# Patient Record
Sex: FEMALE | Race: WHITE | NOT HISPANIC OR LATINO | Employment: STUDENT | URBAN - METROPOLITAN AREA
[De-identification: names, ages, dates, MRNs, and addresses within clinical notes are randomized per-mention and may not be internally consistent; named-entity substitution may affect disease eponyms.]

---

## 2018-11-01 ENCOUNTER — APPOINTMENT (OUTPATIENT)
Dept: CT IMAGING | Facility: HOSPITAL | Age: 23
End: 2018-11-01

## 2018-11-01 ENCOUNTER — HOSPITAL ENCOUNTER (INPATIENT)
Facility: HOSPITAL | Age: 23
LOS: 5 days | Discharge: HOME | End: 2018-11-06
Attending: EMERGENCY MEDICINE | Admitting: HOSPITALIST

## 2018-11-01 DIAGNOSIS — K50.914 EXACERBATION OF CROHN'S DISEASE WITH ABSCESS (HCC): Primary | ICD-10-CM

## 2018-11-01 PROBLEM — J45.20 MILD INTERMITTENT ASTHMA WITHOUT COMPLICATION: Status: ACTIVE | Noted: 2018-11-01

## 2018-11-01 LAB
ALBUMIN SERPL-MCNC: 3.6 G/DL (ref 3.5–5.2)
ALBUMIN/GLOB SERPL: 0.9 G/DL
ALP SERPL-CCNC: 76 U/L (ref 39–117)
ALT SERPL W P-5'-P-CCNC: 23 U/L (ref 1–33)
ANION GAP SERPL CALCULATED.3IONS-SCNC: 13.5 MMOL/L
AST SERPL-CCNC: 24 U/L (ref 1–32)
B-HCG UR QL: NEGATIVE
BACTERIA UR QL AUTO: ABNORMAL /HPF
BASOPHILS # BLD AUTO: 0.02 10*3/MM3 (ref 0–0.2)
BASOPHILS NFR BLD AUTO: 0.2 % (ref 0–1.5)
BILIRUB SERPL-MCNC: 0.6 MG/DL (ref 0.1–1.2)
BILIRUB UR QL STRIP: NEGATIVE
BUN BLD-MCNC: 7 MG/DL (ref 6–20)
BUN/CREAT SERPL: 10.1 (ref 7–25)
CALCIUM SPEC-SCNC: 9.4 MG/DL (ref 8.6–10.5)
CHLORIDE SERPL-SCNC: 104 MMOL/L (ref 98–107)
CLARITY UR: CLEAR
CO2 SERPL-SCNC: 17.5 MMOL/L (ref 22–29)
COLOR UR: YELLOW
CREAT BLD-MCNC: 0.69 MG/DL (ref 0.57–1)
DEPRECATED RDW RBC AUTO: 42.5 FL (ref 37–54)
EOSINOPHIL # BLD AUTO: 0.06 10*3/MM3 (ref 0–0.7)
EOSINOPHIL NFR BLD AUTO: 0.5 % (ref 0.3–6.2)
ERYTHROCYTE [DISTWIDTH] IN BLOOD BY AUTOMATED COUNT: 13.5 % (ref 11.7–13)
GFR SERPL CREATININE-BSD FRML MDRD: 107 ML/MIN/1.73
GLOBULIN UR ELPH-MCNC: 3.9 GM/DL
GLUCOSE BLD-MCNC: 76 MG/DL (ref 65–99)
GLUCOSE UR STRIP-MCNC: NEGATIVE MG/DL
HCT VFR BLD AUTO: 44 % (ref 35.6–45.5)
HGB BLD-MCNC: 13.7 G/DL (ref 11.9–15.5)
HGB UR QL STRIP.AUTO: NEGATIVE
HOLD SPECIMEN: NORMAL
HOLD SPECIMEN: NORMAL
HYALINE CASTS UR QL AUTO: ABNORMAL /LPF
IMM GRANULOCYTES # BLD: 0.02 10*3/MM3 (ref 0–0.03)
IMM GRANULOCYTES NFR BLD: 0.2 % (ref 0–0.5)
KETONES UR QL STRIP: NEGATIVE
LEUKOCYTE ESTERASE UR QL STRIP.AUTO: ABNORMAL
LYMPHOCYTES # BLD AUTO: 1.42 10*3/MM3 (ref 0.9–4.8)
LYMPHOCYTES NFR BLD AUTO: 12.9 % (ref 19.6–45.3)
MCH RBC QN AUTO: 27.2 PG (ref 26.9–32)
MCHC RBC AUTO-ENTMCNC: 31.1 G/DL (ref 32.4–36.3)
MCV RBC AUTO: 87.3 FL (ref 80.5–98.2)
MONOCYTES # BLD AUTO: 0.61 10*3/MM3 (ref 0.2–1.2)
MONOCYTES NFR BLD AUTO: 5.5 % (ref 5–12)
NEUTROPHILS # BLD AUTO: 8.91 10*3/MM3 (ref 1.9–8.1)
NEUTROPHILS NFR BLD AUTO: 80.7 % (ref 42.7–76)
NITRITE UR QL STRIP: NEGATIVE
NRBC BLD MANUAL-RTO: 0 /100 WBC (ref 0–0)
PH UR STRIP.AUTO: 5.5 [PH] (ref 5–8)
PLATELET # BLD AUTO: 309 10*3/MM3 (ref 140–500)
PMV BLD AUTO: 9.9 FL (ref 6–12)
POTASSIUM BLD-SCNC: 4.4 MMOL/L (ref 3.5–5.2)
PROT SERPL-MCNC: 7.5 G/DL (ref 6–8.5)
PROT UR QL STRIP: ABNORMAL
RBC # BLD AUTO: 5.04 10*6/MM3 (ref 3.9–5.2)
RBC # UR: ABNORMAL /HPF
REF LAB TEST METHOD: ABNORMAL
SODIUM BLD-SCNC: 135 MMOL/L (ref 136–145)
SP GR UR STRIP: 1.02 (ref 1–1.03)
SQUAMOUS #/AREA URNS HPF: ABNORMAL /HPF
UROBILINOGEN UR QL STRIP: ABNORMAL
WBC NRBC COR # BLD: 11.04 10*3/MM3 (ref 4.5–10.7)
WBC UR QL AUTO: ABNORMAL /HPF
WHOLE BLOOD HOLD SPECIMEN: NORMAL

## 2018-11-01 PROCEDURE — 81025 URINE PREGNANCY TEST: CPT | Performed by: NURSE PRACTITIONER

## 2018-11-01 PROCEDURE — 80053 COMPREHEN METABOLIC PANEL: CPT | Performed by: NURSE PRACTITIONER

## 2018-11-01 PROCEDURE — 81001 URINALYSIS AUTO W/SCOPE: CPT | Performed by: NURSE PRACTITIONER

## 2018-11-01 PROCEDURE — 25010000002 PIPERACILLIN SOD-TAZOBACTAM PER 1 G: Performed by: EMERGENCY MEDICINE

## 2018-11-01 PROCEDURE — 99284 EMERGENCY DEPT VISIT MOD MDM: CPT

## 2018-11-01 PROCEDURE — 25010000002 PIPERACILLIN SOD-TAZOBACTAM PER 1 G: Performed by: INTERNAL MEDICINE

## 2018-11-01 PROCEDURE — 74177 CT ABD & PELVIS W/CONTRAST: CPT

## 2018-11-01 PROCEDURE — 25010000002 IOPAMIDOL 61 % SOLUTION: Performed by: EMERGENCY MEDICINE

## 2018-11-01 PROCEDURE — 25010000002 HYDROMORPHONE PER 4 MG: Performed by: INTERNAL MEDICINE

## 2018-11-01 PROCEDURE — 25010000002 ONDANSETRON PER 1 MG: Performed by: INTERNAL MEDICINE

## 2018-11-01 PROCEDURE — 85025 COMPLETE CBC W/AUTO DIFF WBC: CPT | Performed by: NURSE PRACTITIONER

## 2018-11-01 RX ORDER — SODIUM CHLORIDE 0.9 % (FLUSH) 0.9 %
3 SYRINGE (ML) INJECTION EVERY 12 HOURS SCHEDULED
Status: DISCONTINUED | OUTPATIENT
Start: 2018-11-01 | End: 2018-11-06 | Stop reason: HOSPADM

## 2018-11-01 RX ORDER — SODIUM CHLORIDE 0.9 % (FLUSH) 0.9 %
3-10 SYRINGE (ML) INJECTION AS NEEDED
Status: DISCONTINUED | OUTPATIENT
Start: 2018-11-01 | End: 2018-11-06 | Stop reason: HOSPADM

## 2018-11-01 RX ORDER — PROPRANOLOL HYDROCHLORIDE 10 MG/1
10 TABLET ORAL DAILY
COMMUNITY
End: 2019-05-14

## 2018-11-01 RX ORDER — HYDROCODONE BITARTRATE AND ACETAMINOPHEN 5; 325 MG/1; MG/1
1 TABLET ORAL EVERY 4 HOURS PRN
Status: DISCONTINUED | OUTPATIENT
Start: 2018-11-01 | End: 2018-11-02

## 2018-11-01 RX ORDER — NALOXONE HCL 0.4 MG/ML
0.4 VIAL (ML) INJECTION
Status: DISCONTINUED | OUTPATIENT
Start: 2018-11-01 | End: 2018-11-06 | Stop reason: HOSPADM

## 2018-11-01 RX ORDER — HYDROMORPHONE HYDROCHLORIDE 1 MG/ML
0.5 INJECTION, SOLUTION INTRAMUSCULAR; INTRAVENOUS; SUBCUTANEOUS
Status: DISCONTINUED | OUTPATIENT
Start: 2018-11-01 | End: 2018-11-02

## 2018-11-01 RX ORDER — ALBUTEROL SULFATE 2.5 MG/3ML
2.5 SOLUTION RESPIRATORY (INHALATION) EVERY 6 HOURS PRN
Status: DISCONTINUED | OUTPATIENT
Start: 2018-11-01 | End: 2018-11-06 | Stop reason: HOSPADM

## 2018-11-01 RX ORDER — ONDANSETRON 4 MG/1
4 TABLET, FILM COATED ORAL EVERY 6 HOURS PRN
Status: DISCONTINUED | OUTPATIENT
Start: 2018-11-01 | End: 2018-11-06 | Stop reason: HOSPADM

## 2018-11-01 RX ORDER — ACETAMINOPHEN 325 MG/1
650 TABLET ORAL EVERY 4 HOURS PRN
Status: DISCONTINUED | OUTPATIENT
Start: 2018-11-01 | End: 2018-11-06 | Stop reason: HOSPADM

## 2018-11-01 RX ORDER — ONDANSETRON 4 MG/1
4 TABLET, ORALLY DISINTEGRATING ORAL EVERY 6 HOURS PRN
Status: DISCONTINUED | OUTPATIENT
Start: 2018-11-01 | End: 2018-11-06 | Stop reason: HOSPADM

## 2018-11-01 RX ORDER — ONDANSETRON 2 MG/ML
4 INJECTION INTRAMUSCULAR; INTRAVENOUS EVERY 6 HOURS PRN
Status: DISCONTINUED | OUTPATIENT
Start: 2018-11-01 | End: 2018-11-06 | Stop reason: HOSPADM

## 2018-11-01 RX ORDER — SODIUM CHLORIDE 9 MG/ML
75 INJECTION, SOLUTION INTRAVENOUS CONTINUOUS
Status: DISCONTINUED | OUTPATIENT
Start: 2018-11-01 | End: 2018-11-04

## 2018-11-01 RX ADMIN — Medication 3 ML: at 21:45

## 2018-11-01 RX ADMIN — SODIUM CHLORIDE 100 ML/HR: 9 INJECTION, SOLUTION INTRAVENOUS at 18:33

## 2018-11-01 RX ADMIN — TAZOBACTAM SODIUM AND PIPERACILLIN SODIUM 3.38 G: 375; 3 INJECTION, SOLUTION INTRAVENOUS at 15:27

## 2018-11-01 RX ADMIN — HYDROMORPHONE HYDROCHLORIDE 0.5 MG: 1 INJECTION, SOLUTION INTRAMUSCULAR; INTRAVENOUS; SUBCUTANEOUS at 20:29

## 2018-11-01 RX ADMIN — TAZOBACTAM SODIUM AND PIPERACILLIN SODIUM 3.38 G: 375; 3 INJECTION, SOLUTION INTRAVENOUS at 20:28

## 2018-11-01 RX ADMIN — ONDANSETRON 4 MG: 2 INJECTION INTRAMUSCULAR; INTRAVENOUS at 21:52

## 2018-11-01 RX ADMIN — HYDROMORPHONE HYDROCHLORIDE 0.5 MG: 1 INJECTION, SOLUTION INTRAMUSCULAR; INTRAVENOUS; SUBCUTANEOUS at 22:42

## 2018-11-01 RX ADMIN — IOPAMIDOL 85 ML: 612 INJECTION, SOLUTION INTRAVENOUS at 13:49

## 2018-11-01 NOTE — PROGRESS NOTES
"Pharmacy Consult - Zosyn Dosing (Initial Note)    Adrienne Rodrigues has been consulted for pharmacy to dose Zosyn for exacerbation of Crohn's disease with abscess per Dr. Benites's request.    Duration of Therapy: 7 days    Other Antimicrobials: None    Relevant clinical data and objective history reviewed:  23 y.o. female 172.7 cm (68\") 72.1 kg (159 lb)    Vitals:    11/01/18 1132 11/01/18 1132 11/01/18 1403 11/01/18 1525   BP:  118/82 112/58 122/84   BP Location:       Pulse:  79 72 82   Resp:  18 16 16   Temp:       TempSrc:       SpO2:  99% 96% 95%     Patient presented to the hospital with worsening lower abdominal pain associated with intermittent constipation and diarrhea. CT scan on 11/1 showed 2 cm thick-walled mesenteric fluid collection adjacent to the terminal ileum. GI has been consulted on the case.     Creatinine   Date Value Ref Range Status   11/01/2018 0.69 0.57 - 1.00 mg/dL Final     BUN   Date Value Ref Range Status   11/01/2018 7 6 - 20 mg/dL Final     Estimated Creatinine Clearance: 146.8 mL/min (by C-G formula based on SCr of 0.69 mg/dL).    Lab Results   Component Value Date    WBC 11.04 (H) 11/01/2018     Temp Readings from Last 3 Encounters:   11/01/18 98.5 °F (36.9 °C) (Tympanic)      Baseline culture/source/susceptibility:   11/1: Blood cultures x 1-in process    Assessment/Plan    1. Zosyn 3.375g IV once was given over 30 minutes in the ED around 1530. Will start Zosyn 3.375g IV q8h extended infusion at 2130 based on risk stratification and renal function (CrCl >20 mL/min).    2. Will monitor serum creatinine every 24 hours for the first 3 days then at least every 48 hours per dosing recommendations. SCr was 0.69 this afternoon before antibiotics were started.     3. Pharmacy will continue to follow daily while on Zosyn, and adjust if needed.     Thank you for allowing me to participate in your patient's care,  Kimberlee Carlson, Pharm.D., BCPS  "

## 2018-11-01 NOTE — PROGRESS NOTES
Clinical Pharmacy Services: Medication History    Adrienne Rodrigues is a 23 y.o. female presenting to [facility] for   Chief Complaint   Patient presents with   • Abdominal Pain     Hx crohns disease, pain started x1 month ago, pt doesn't see her new MD until next week.    • Diarrhea       She  has a past medical history of Appendicitis; Asthma; and Crohn's disease (CMS/[facility]).    Allergies as of 11/01/2018   • (No Known Allergies)       Medication information was obtained from: Patient  Pharmacy and Phone Number: [facility] [phone]    Prior to Admission Medications     Prescriptions Last Dose Informant Patient Reported? Taking?    lisdexamfetamine (VYVANSE) 40 MG capsule 10/31/2018 Self Yes Yes    Take 40 mg by mouth Every Morning    LO LOESTRIN FE 1 MG-10 MCG / 10 MCG tablet 11/1/2018 Self Yes Yes    Take 1 tablet by mouth daily.    propranolol (INDERAL) 10 MG tablet 10/31/2018 Self Yes Yes    Take 10 mg by mouth Daily.            Medication notes: Propranolol added per patient, recently started.    This medication list is complete to the best of my knowledge as of 11/1/2018    Please call if questions.    Radha Coleman, Medication History Technician  11/1/2018 4:58 PM

## 2018-11-01 NOTE — PLAN OF CARE
Problem: Patient Care Overview  Goal: Plan of Care Review  Outcome: Ongoing (interventions implemented as appropriate)   11/01/18 1940   Coping/Psychosocial   Plan of Care Reviewed With patient;family;mother   Plan of Care Review   Progress no change   OTHER   Outcome Summary Pt arrived from ED with abd pain. Started on IV fluids. VSS.will continue to monitor.      Goal: Individualization and Mutuality  Outcome: Ongoing (interventions implemented as appropriate)    Goal: Discharge Needs Assessment  Outcome: Ongoing (interventions implemented as appropriate)    Goal: Interprofessional Rounds/Family Conf  Outcome: Ongoing (interventions implemented as appropriate)      Problem: Pain, Acute (Adult)  Goal: Identify Related Risk Factors and Signs and Symptoms  Outcome: Outcome(s) achieved Date Met: 11/01/18    Goal: Acceptable Pain Control/Comfort Level  Outcome: Ongoing (interventions implemented as appropriate)

## 2018-11-01 NOTE — H&P
Name: Adrienne Rodrigues ADMIT: 2018   : 1995  PCP: Breezy Valentine MD    MRN: [id number] LOS: 0 days   AGE/SEX: 23 y.o. female  ROOM: 42/42     Chief Complaint   Patient presents with   • Abdominal Pain     Hx crohns disease, pain started x1 month ago, pt doesn't see her new MD until next week.    • Diarrhea       Subjective   Ms. Rodrigues is a 23 y.o. female with a history of crohn's disease who presents to [facility] with about 1 week of worsening lower abdominal pain associated with intermittent constipation and diarrhea.  She reports no fevers but she has had some chills this morning.  The pain has been progressively worsening is the worst in her right lower quadrant and is a sharp stabbing pain.  It is exacerbated with movement and bearing down.  Relief based on position.  She has a long history of Crohn's disease since childhood and has not had any recent exacerbations.  She was scheduled to transition her care to a gastroenterologist but she has not establish care.  She has no nausea and vomiting.    On CT scan in the emergency room she was found to have 2 cm abscess.  So she is being admitted for antibiotic care GI and surgical consultation.    History of Present Illness    Past Medical History:   Diagnosis Date   • Appendicitis     H/O SUBACUTE PERFORATED APPENDICITIS   • Asthma     RESCUE INHALER, HASN'T USED   • Crohn's disease (CMS/HCC)      Past Surgical History:   Procedure Laterality Date   • APPENDECTOMY N/A 2016    Procedure: APPENDECTOMY LAPAROSCOPIC;  Surgeon: Donnie Carlson MD;  Location: [facility];  Service:    • TONSILLECTOMY       Family History   Problem Relation Age of Onset   • Cancer Maternal Grandfather    • COPD Maternal Grandfather    • Miscarriages / Stillbirths Mother    • Crohn's disease Mother      Social History   Substance Use Topics   • Smoking status: Never Smoker   • Smokeless tobacco: Not on file   • Alcohol use No       (Not in a  hospital admission)  Allergies:  No Known Allergies    Review of Systems   Constitutional: Positive for chills. Negative for fever.   HENT: Negative for sore throat and trouble swallowing.    Eyes: Negative for pain and visual disturbance.   Respiratory: Negative for cough, shortness of breath and wheezing.    Cardiovascular: Negative for chest pain, palpitations and leg swelling.   Gastrointestinal: Positive for constipation and diarrhea. Negative for nausea and vomiting.   Endocrine: Negative for cold intolerance and heat intolerance.   Genitourinary: Negative for difficulty urinating.   Musculoskeletal: Negative for neck pain and neck stiffness.   Skin: Negative for pallor and rash.   Allergic/Immunologic: Negative for environmental allergies and food allergies.   Neurological: Negative for seizures and syncope.   Hematological: Negative for adenopathy.   Psychiatric/Behavioral: Negative for agitation and confusion.        Objective    Vital Signs  Temp:  [98.5 °F (36.9 °C)] 98.5 °F (36.9 °C)  Heart Rate:  [] 82  Resp:  [16-18] 16  BP: (101-122)/(58-84) 122/84  SpO2:  [92 %-99 %] 95 %  on   ;   Device (Oxygen Therapy): room air  Body mass index is 24.18 kg/m².    Physical Exam   Constitutional: She is oriented to person, place, and time. She appears well-developed. No distress.   HENT:   Head: Atraumatic.   Nose: Nose normal.   Eyes: Pupils are equal, round, and reactive to light. Conjunctivae and EOM are normal.   Neck: Normal range of motion. Neck supple.   Cardiovascular: Normal rate, regular rhythm and intact distal pulses.    Pulmonary/Chest: Effort normal. She has no wheezes.   Abdominal: Soft. She exhibits no distension. There is tenderness (RLQ). There is guarding.   Musculoskeletal: Normal range of motion. She exhibits no edema.   Neurological: She is alert and oriented to person, place, and time. No cranial nerve deficit.   Skin: Skin is warm and dry. She is not diaphoretic.   Psychiatric: She has  a normal mood and affect. Her behavior is normal.   Nursing note and vitals reviewed.      Results Review:  I reviewed the patient's new clinical results.  I reviewed imaging, agree with interpretation.  I reviewed EKG/telemetry, sinus rhythm.  I reviewed prior records.    Lab Results (last 24 hours)     Procedure Component Value Units Date/Time    CBC & Differential [[id number]] Collected:  11/01/18 1121    Specimen:  Blood Updated:  11/01/18 1219    Narrative:       The following orders were created for panel order CBC & Differential.  Procedure                               Abnormality         Status                     ---------                               -----------         ------                     Scan Slide[[id number]]                                                                   CBC Auto Differential[[id number]]         Abnormal            Final result                 Please view results for these tests on the individual orders.    CBC Auto Differential [[id number]]  (Abnormal) Collected:  11/01/18 1121    Specimen:  Blood Updated:  11/01/18 1219     WBC 11.04 (H) 10*3/mm3      RBC 5.04 10*6/mm3      Hemoglobin 13.7 g/dL      Hematocrit 44.0 %      MCV 87.3 fL      MCH 27.2 pg      MCHC 31.1 (L) g/dL      RDW 13.5 (H) %      RDW-SD 42.5 fl      MPV 9.9 fL      Platelets 309 10*3/mm3      Neutrophil % 80.7 (H) %      Lymphocyte % 12.9 (L) %      Monocyte % 5.5 %      Eosinophil % 0.5 %      Basophil % 0.2 %      Immature Grans % 0.2 %      Neutrophils, Absolute 8.91 (H) 10*3/mm3      Lymphocytes, Absolute 1.42 10*3/mm3      Monocytes, Absolute 0.61 10*3/mm3      Eosinophils, Absolute 0.06 10*3/mm3      Basophils, Absolute 0.02 10*3/mm3      Immature Grans, Absolute 0.02 10*3/mm3      nRBC 0.0 /100 WBC     Urinalysis With Microscopic If Indicated (No Culture) - Urine, Clean Catch [[id number]]  (Abnormal) Collected:  11/01/18 1132    Specimen:  Urine from Urine, Clean Catch Updated:  11/01/18 1146     Color, UA  Yellow     Appearance, UA Clear     pH, UA 5.5     Specific Gravity, UA 1.022     Glucose, UA Negative     Ketones, UA Negative     Bilirubin, UA Negative     Blood, UA Negative     Protein, UA Trace (A)     Leuk Esterase, UA Small (1+) (A)     Nitrite, UA Negative     Urobilinogen, UA 0.2 E.U./dL    Pregnancy, Urine - Urine, Clean Catch [[id number]]  (Normal) Collected:  11/01/18 1132    Specimen:  Urine from Urine, Clean Catch Updated:  11/01/18 1145     HCG, Urine QL Negative    Urinalysis, Microscopic Only - Urine, Clean Catch [[id number]]  (Abnormal) Collected:  11/01/18 1132    Specimen:  Urine from Urine, Clean Catch Updated:  11/01/18 1146     RBC, UA 0-2 /HPF      WBC, UA 3-5 (A) /HPF      Bacteria, UA None Seen /HPF      Squamous Epithelial Cells, UA 3-6 (A) /HPF      Hyaline Casts, UA 3-6 /LPF      Methodology Automated Microscopy    Comprehensive Metabolic Panel [[id number]]  (Abnormal) Collected:  11/01/18 1213    Specimen:  Blood Updated:  11/01/18 1256     Glucose 76 mg/dL      BUN 7 mg/dL      Creatinine 0.69 mg/dL      Sodium 135 (L) mmol/L      Potassium 4.4 mmol/L      Chloride 104 mmol/L      CO2 17.5 (L) mmol/L      Calcium 9.4 mg/dL      Total Protein 7.5 g/dL      Albumin 3.60 g/dL      ALT (SGPT) 23 U/L      AST (SGOT) 24 U/L      Comment: Specimen hemolyzed.  Results may be affected.        Alkaline Phosphatase 76 U/L      Total Bilirubin 0.6 mg/dL      eGFR Non African Amer 107 mL/min/1.73      Globulin 3.9 gm/dL      A/G Ratio 0.9 g/dL      BUN/Creatinine Ratio 10.1     Anion Gap 13.5 mmol/L           CT Abdomen Pelvis With Contrast   Preliminary Result   There is acute terminal ileitis involving approximately 10   cm segment of terminal ileum. The approximately 2 cm thick-walled   mesenteric fluid collection adjacently likely represents an abscess.   There is also a tiny amount of free fluid.       Discussed with Dr. Rivas.            Assessment/Plan      Active Hospital Problems    Diagnosis  Date Noted   • **Exacerbation of Crohn's disease with abscess (CMS/[facility]) [K50.914] 11/01/2018   • Mild intermittent asthma without complication [J45.20] 11/01/2018      Resolved Hospital Problems    Diagnosis Date Noted Date Resolved   No resolved problems to display.     · Crohn's colitis with abscess without bleeding: We'll draw blood cultures and start Zosyn for empiric treatment.  Start fluids and make nothing by mouth.  Control pain.  Consult gastroenterology and surgery.  · Asthma: Albuterol when necessary.  No acute exacerbation on exam.  · Prophylaxis: SCD  · Full code    I discussed the patients findings and my recommendations with patient, nursing staff and consulting provider.    Andi Benites MD  [facility]  11/01/18  3:44 PM

## 2018-11-01 NOTE — ED PROVIDER NOTES
" EMERGENCY DEPARTMENT ENCOUNTER    CHIEF COMPLAINT  Chief Complaint: RLQ abd pain  History given by: Pt  History limited by: Nothing  Room Number:   PMD: Breezy Valentine MD      HPI:  Pt is a 23 y.o. female with a h/o Crohn's disease who presents complaining of moderate \"deep cramp\" RLQ abd pain which began 1 month ago, and worsened over the last week. The pain is worse with movement. The pt has felt the pain before which was caused by Crohn's disease. The pt denies fever, headache, CP, SOA, vomiting, black or bloody stool, dysuria, leg pain, or leg swelling, and confirms alternating constipation and diarrhea. The pt's LMP was 1 month ago. The pt has had an appendectomy.    Duration/Onset/Timin month/gradual/constant worsening  Location: RLQ  Radiation: None  Quality: \"deep cramp\"  Intensity/Severity: Moderate  Associated Symptoms: Constipation and diarrhea  Aggravating or Alleviating Factors: Movement worsens the pain  Previous Episodes: The pt has felt the pain before when she was younger related to Crohn's disease.      PAST MEDICAL HISTORY  Active Ambulatory Problems     Diagnosis Date Noted   • Mallet finger of left hand 2016   • Knee pain, acute 2016   • Ruptured appendicitis 2016     Resolved Ambulatory Problems     Diagnosis Date Noted   • No Resolved Ambulatory Problems     Past Medical History:   Diagnosis Date   • Appendicitis    • Asthma    • Crohn's disease (CMS/[facility])        PAST SURGICAL HISTORY  Past Surgical History:   Procedure Laterality Date   • APPENDECTOMY N/A 2016    Procedure: APPENDECTOMY LAPAROSCOPIC;  Surgeon: Donnie Carlson MD;  Location: [facility];  Service:    • TONSILLECTOMY         FAMILY HISTORY  Family History   Problem Relation Age of Onset   • Cancer Maternal Grandfather    • COPD Maternal Grandfather    • Miscarriages / Stillbirths Mother    • Crohn's disease Mother        SOCIAL HISTORY  Social History     Social History   • Marital " status: Single     Spouse name: N/A   • Number of children: N/A   • Years of education: N/A     Occupational History   • Not on file.     Social History Main Topics   • Smoking status: Never Smoker   • Smokeless tobacco: Not on file   • Alcohol use No   • Drug use: No   • Sexual activity: Defer     Other Topics Concern   • Not on file     Social History Narrative   • No narrative on file       ALLERGIES  Patient has no known allergies.    REVIEW OF SYSTEMS  Review of Systems   Constitutional: Negative.  Negative for fever.   HENT: Negative.  Negative for sore throat.    Eyes: Negative.    Respiratory: Negative.  Negative for cough.    Cardiovascular: Negative.  Negative for chest pain.   Gastrointestinal: Positive for abdominal pain, constipation and diarrhea.   Genitourinary: Negative.  Negative for dysuria.   Musculoskeletal: Negative.  Negative for back pain.   Skin: Negative.  Negative for rash.   Neurological: Negative.  Negative for headaches.   All other systems reviewed and are negative.      PHYSICAL EXAM  ED Triage Vitals   Temp Heart Rate Resp BP SpO2   11/01/18 1058 11/01/18 1058 11/01/18 1058 11/01/18 1117 11/01/18 1058   98.5 °F (36.9 °C) 108 16 101/72 92 %      Temp src Heart Rate Source Patient Position BP Location FiO2 (%)   11/01/18 1058 11/01/18 1132 11/01/18 1117 11/01/18 1117 --   Tympanic Monitor Sitting Right arm        Physical Exam   Constitutional: No distress.   HENT:   Head: Normocephalic and atraumatic.   Mouth/Throat: Oropharynx is clear and moist.   Eyes: Conjunctivae are normal.   Cardiovascular: Normal rate and regular rhythm.    Pulmonary/Chest: Breath sounds normal. No respiratory distress.   Abdominal: There is tenderness (Moderate) in the right lower quadrant and suprapubic area.   Musculoskeletal: She exhibits no edema or tenderness.   Neurological: She is alert.   Skin: No rash noted.   Nursing note and vitals reviewed.      LAB RESULTS  Lab Results (last 24 hours)      Procedure Component Value Units Date/Time    CBC & Differential [[id number]] Collected:  11/01/18 1121    Specimen:  Blood Updated:  11/01/18 1219    Narrative:       The following orders were created for panel order CBC & Differential.  Procedure                               Abnormality         Status                     ---------                               -----------         ------                     Scan Slide[[id number]]                                                                   CBC Auto Differential[[id number]]         Abnormal            Final result                 Please view results for these tests on the individual orders.    CBC Auto Differential [[id number]]  (Abnormal) Collected:  11/01/18 1121    Specimen:  Blood Updated:  11/01/18 1219     WBC 11.04 (H) 10*3/mm3      RBC 5.04 10*6/mm3      Hemoglobin 13.7 g/dL      Hematocrit 44.0 %      MCV 87.3 fL      MCH 27.2 pg      MCHC 31.1 (L) g/dL      RDW 13.5 (H) %      RDW-SD 42.5 fl      MPV 9.9 fL      Platelets 309 10*3/mm3      Neutrophil % 80.7 (H) %      Lymphocyte % 12.9 (L) %      Monocyte % 5.5 %      Eosinophil % 0.5 %      Basophil % 0.2 %      Immature Grans % 0.2 %      Neutrophils, Absolute 8.91 (H) 10*3/mm3      Lymphocytes, Absolute 1.42 10*3/mm3      Monocytes, Absolute 0.61 10*3/mm3      Eosinophils, Absolute 0.06 10*3/mm3      Basophils, Absolute 0.02 10*3/mm3      Immature Grans, Absolute 0.02 10*3/mm3      nRBC 0.0 /100 WBC     Urinalysis With Microscopic If Indicated (No Culture) - Urine, Clean Catch [[id number]]  (Abnormal) Collected:  11/01/18 1132    Specimen:  Urine from Urine, Clean Catch Updated:  11/01/18 1146     Color, UA Yellow     Appearance, UA Clear     pH, UA 5.5     Specific Gravity, UA 1.022     Glucose, UA Negative     Ketones, UA Negative     Bilirubin, UA Negative     Blood, UA Negative     Protein, UA Trace (A)     Leuk Esterase, UA Small (1+) (A)     Nitrite, UA Negative     Urobilinogen, UA 0.2 E.U./dL     Pregnancy, Urine - Urine, Clean Catch [[id number]]  (Normal) Collected:  11/01/18 1132    Specimen:  Urine from Urine, Clean Catch Updated:  11/01/18 1145     HCG, Urine QL Negative    Urinalysis, Microscopic Only - Urine, Clean Catch [[id number]]  (Abnormal) Collected:  11/01/18 1132    Specimen:  Urine from Urine, Clean Catch Updated:  11/01/18 1146     RBC, UA 0-2 /HPF      WBC, UA 3-5 (A) /HPF      Bacteria, UA None Seen /HPF      Squamous Epithelial Cells, UA 3-6 (A) /HPF      Hyaline Casts, UA 3-6 /LPF      Methodology Automated Microscopy    Comprehensive Metabolic Panel [[id number]]  (Abnormal) Collected:  11/01/18 1213    Specimen:  Blood Updated:  11/01/18 1256     Glucose 76 mg/dL      BUN 7 mg/dL      Creatinine 0.69 mg/dL      Sodium 135 (L) mmol/L      Potassium 4.4 mmol/L      Chloride 104 mmol/L      CO2 17.5 (L) mmol/L      Calcium 9.4 mg/dL      Total Protein 7.5 g/dL      Albumin 3.60 g/dL      ALT (SGPT) 23 U/L      AST (SGOT) 24 U/L      Comment: Specimen hemolyzed.  Results may be affected.        Alkaline Phosphatase 76 U/L      Total Bilirubin 0.6 mg/dL      eGFR Non African Amer 107 mL/min/1.73      Globulin 3.9 gm/dL      A/G Ratio 0.9 g/dL      BUN/Creatinine Ratio 10.1     Anion Gap 13.5 mmol/L           I ordered the above labs and reviewed the results    RADIOLOGY  CT Abdomen Pelvis With Contrast   Preliminary Result   There is acute terminal ileitis involving approximately 10   cm segment of terminal ileum. The approximately 2 cm thick-walled   mesenteric fluid collection adjacently likely represents an abscess.   There is also a tiny amount of free fluid.       Discussed with Dr. Rivas.               I ordered the above noted radiological studies. Interpreted by radiologist. Reviewed by me in PACS.       PROCEDURES  Procedures      PROGRESS AND CONSULTS  ED Course as of Nov 01 1538   Thu Nov 01, 2018   1122 Generalized abd pain; hx of crohns disease  [KG]      ED Course User Index  [KG]  Batsheva Medina, APRN     1251 Discussed the pt's lab results, including mildly elevated WBC. Discussed the plan to order a CT abd pelvis for further evaluation. The pt agrees with the plan and all questions were addressed.     1302 Ordered CT abd pelvis with contrast for further evaluation.    1434 Rechecked the patient and she is resting comfortably. Discussed pertinent lab and imaging results, including CT abd pelvis which shows a Crohn's exacerbation and an abscess. Discussed inpatient and outpatient treatment options for her abscess. The pt and family would like to be admitted. Patient agrees with plan and all questions were addressed.     1458 Placed call to [facility] for admission. Ordered Zosyn for abscess.     1520 Discussed the pt with Dr. Benites ([facility]) who agrees to admit the pt.      1537 Ordered inpatient admission.     MEDICAL DECISION MAKING  Results were reviewed/discussed with the patient and they were also made aware of online access. Pt also made aware that some labs, such as cultures, will not be resulted during ER visit and follow up with PMD is necessary.     MDM  Number of Diagnoses or Management Options  Exacerbation of Crohn's disease with abscess (CMS/HCC):      Amount and/or Complexity of Data Reviewed  Clinical lab tests: ordered and reviewed (WBC: 11.04)  Tests in the radiology section of CPT®: ordered and reviewed (CT abd pelvis: acute terminal ileus, mesenteric abscess)  Decide to obtain previous medical records or to obtain history from someone other than the patient: yes  Review and summarize past medical records: yes  Discuss the patient with other providers: yes (Dr. Benites ([facility]))    Patient Progress  Patient progress: stable         DIAGNOSIS  Final diagnoses:   Exacerbation of Crohn's disease with abscess (CMS/HCC)       DISPOSITION  ADMISSION    Discussed treatment plan and reason for admission with pt/family and admitting physician.  Pt/family voiced understanding of the plan  for admission for further testing/treatment as needed.     Latest Documented Vital Signs:  As of 3:38 PM  BP- 122/84 HR- 82 Temp- 98.5 °F (36.9 °C) (Tympanic) O2 sat- 95%    --  Documentation assistance provided by kristi Osborne for Dr. Rivas.  Information recorded by the scribe was done at my direction and has been verified and validated by me.     Margie Osborne  11/01/18 1302       Margie Osborne  11/01/18 1440       Margie Osborne  11/01/18 1538       Hernandez Rivas MD  11/01/18 3120

## 2018-11-02 LAB
ANION GAP SERPL CALCULATED.3IONS-SCNC: 13 MMOL/L
BASOPHILS # BLD AUTO: 0.02 10*3/MM3 (ref 0–0.2)
BASOPHILS NFR BLD AUTO: 0.2 % (ref 0–1.5)
BUN BLD-MCNC: 6 MG/DL (ref 6–20)
BUN/CREAT SERPL: 8.8 (ref 7–25)
CALCIUM SPEC-SCNC: 9.1 MG/DL (ref 8.6–10.5)
CHLORIDE SERPL-SCNC: 104 MMOL/L (ref 98–107)
CO2 SERPL-SCNC: 24 MMOL/L (ref 22–29)
CREAT BLD-MCNC: 0.68 MG/DL (ref 0.57–1)
CRP SERPL-MCNC: 4.78 MG/DL (ref 0–0.5)
DEPRECATED RDW RBC AUTO: 41.4 FL (ref 37–54)
EOSINOPHIL # BLD AUTO: 0 10*3/MM3 (ref 0–0.7)
EOSINOPHIL NFR BLD AUTO: 0 % (ref 0.3–6.2)
ERYTHROCYTE [DISTWIDTH] IN BLOOD BY AUTOMATED COUNT: 12.8 % (ref 11.7–13)
ERYTHROCYTE [SEDIMENTATION RATE] IN BLOOD: 19 MM/HR (ref 0–20)
GFR SERPL CREATININE-BSD FRML MDRD: 109 ML/MIN/1.73
GLUCOSE BLD-MCNC: 82 MG/DL (ref 65–99)
HBV SURFACE AB SER RIA-ACNC: REACTIVE
HBV SURFACE AG SERPL QL IA: NORMAL
HCT VFR BLD AUTO: 37.6 % (ref 35.6–45.5)
HGB BLD-MCNC: 12.4 G/DL (ref 11.9–15.5)
IMM GRANULOCYTES # BLD: 0.01 10*3/MM3 (ref 0–0.03)
IMM GRANULOCYTES NFR BLD: 0.1 % (ref 0–0.5)
INR PPP: 1.14 (ref 0.9–1.1)
LYMPHOCYTES # BLD AUTO: 1.55 10*3/MM3 (ref 0.9–4.8)
LYMPHOCYTES NFR BLD AUTO: 16.8 % (ref 19.6–45.3)
MCH RBC QN AUTO: 28.6 PG (ref 26.9–32)
MCHC RBC AUTO-ENTMCNC: 33 G/DL (ref 32.4–36.3)
MCV RBC AUTO: 86.6 FL (ref 80.5–98.2)
MONOCYTES # BLD AUTO: 0.7 10*3/MM3 (ref 0.2–1.2)
MONOCYTES NFR BLD AUTO: 7.6 % (ref 5–12)
NEUTROPHILS # BLD AUTO: 6.96 10*3/MM3 (ref 1.9–8.1)
NEUTROPHILS NFR BLD AUTO: 75.4 % (ref 42.7–76)
NRBC BLD MANUAL-RTO: 0 /100 WBC (ref 0–0)
PLATELET # BLD AUTO: 276 10*3/MM3 (ref 140–500)
PMV BLD AUTO: 9.8 FL (ref 6–12)
POTASSIUM BLD-SCNC: 4.2 MMOL/L (ref 3.5–5.2)
PROTHROMBIN TIME: 14.4 SECONDS (ref 11.7–14.2)
RBC # BLD AUTO: 4.34 10*6/MM3 (ref 3.9–5.2)
SODIUM BLD-SCNC: 141 MMOL/L (ref 136–145)
WBC NRBC COR # BLD: 9.23 10*3/MM3 (ref 4.5–10.7)

## 2018-11-02 PROCEDURE — 81479 UNLISTED MOLECULAR PATHOLOGY: CPT | Performed by: INTERNAL MEDICINE

## 2018-11-02 PROCEDURE — 99254 IP/OBS CNSLTJ NEW/EST MOD 60: CPT | Performed by: INTERNAL MEDICINE

## 2018-11-02 PROCEDURE — 83520 IMMUNOASSAY QUANT NOS NONAB: CPT | Performed by: INTERNAL MEDICINE

## 2018-11-02 PROCEDURE — 82397 CHEMILUMINESCENT ASSAY: CPT | Performed by: INTERNAL MEDICINE

## 2018-11-02 PROCEDURE — 86140 C-REACTIVE PROTEIN: CPT | Performed by: INTERNAL MEDICINE

## 2018-11-02 PROCEDURE — 87340 HEPATITIS B SURFACE AG IA: CPT | Performed by: INTERNAL MEDICINE

## 2018-11-02 PROCEDURE — 85610 PROTHROMBIN TIME: CPT | Performed by: INTERNAL MEDICINE

## 2018-11-02 PROCEDURE — 99253 IP/OBS CNSLTJ NEW/EST LOW 45: CPT | Performed by: SURGERY

## 2018-11-02 PROCEDURE — 25010000002 MORPHINE PER 10 MG: Performed by: INTERNAL MEDICINE

## 2018-11-02 PROCEDURE — 25010000002 PIPERACILLIN SOD-TAZOBACTAM PER 1 G: Performed by: INTERNAL MEDICINE

## 2018-11-02 PROCEDURE — 85025 COMPLETE CBC W/AUTO DIFF WBC: CPT | Performed by: INTERNAL MEDICINE

## 2018-11-02 PROCEDURE — 25010000002 ONDANSETRON PER 1 MG: Performed by: INTERNAL MEDICINE

## 2018-11-02 PROCEDURE — 36415 COLL VENOUS BLD VENIPUNCTURE: CPT | Performed by: INTERNAL MEDICINE

## 2018-11-02 PROCEDURE — 85652 RBC SED RATE AUTOMATED: CPT | Performed by: INTERNAL MEDICINE

## 2018-11-02 PROCEDURE — 80048 BASIC METABOLIC PNL TOTAL CA: CPT | Performed by: INTERNAL MEDICINE

## 2018-11-02 PROCEDURE — 88350 IMFLUOR EA ADDL 1ANTB STN PX: CPT | Performed by: INTERNAL MEDICINE

## 2018-11-02 PROCEDURE — 88346 IMFLUOR 1ST 1ANTB STAIN PX: CPT | Performed by: INTERNAL MEDICINE

## 2018-11-02 PROCEDURE — 86706 HEP B SURFACE ANTIBODY: CPT | Performed by: INTERNAL MEDICINE

## 2018-11-02 PROCEDURE — 86480 TB TEST CELL IMMUN MEASURE: CPT | Performed by: INTERNAL MEDICINE

## 2018-11-02 RX ORDER — PROPRANOLOL HYDROCHLORIDE 10 MG/1
10 TABLET ORAL DAILY
Status: DISCONTINUED | OUTPATIENT
Start: 2018-11-02 | End: 2018-11-06 | Stop reason: HOSPADM

## 2018-11-02 RX ORDER — MORPHINE SULFATE 2 MG/ML
1 INJECTION, SOLUTION INTRAMUSCULAR; INTRAVENOUS EVERY 4 HOURS PRN
Status: DISCONTINUED | OUTPATIENT
Start: 2018-11-02 | End: 2018-11-03

## 2018-11-02 RX ORDER — HYDROCODONE BITARTRATE AND ACETAMINOPHEN 7.5; 325 MG/1; MG/1
1 TABLET ORAL EVERY 4 HOURS PRN
Status: DISCONTINUED | OUTPATIENT
Start: 2018-11-02 | End: 2018-11-06 | Stop reason: HOSPADM

## 2018-11-02 RX ADMIN — TAZOBACTAM SODIUM AND PIPERACILLIN SODIUM 3.38 G: 375; 3 INJECTION, SOLUTION INTRAVENOUS at 15:35

## 2018-11-02 RX ADMIN — ONDANSETRON 4 MG: 2 INJECTION INTRAMUSCULAR; INTRAVENOUS at 23:05

## 2018-11-02 RX ADMIN — TAZOBACTAM SODIUM AND PIPERACILLIN SODIUM 3.38 G: 375; 3 INJECTION, SOLUTION INTRAVENOUS at 05:31

## 2018-11-02 RX ADMIN — HYDROCODONE BITARTRATE AND ACETAMINOPHEN 1 TABLET: 5; 325 TABLET ORAL at 05:32

## 2018-11-02 RX ADMIN — HYDROCODONE BITARTRATE AND ACETAMINOPHEN 1 TABLET: 7.5; 325 TABLET ORAL at 15:43

## 2018-11-02 RX ADMIN — HYDROCODONE BITARTRATE AND ACETAMINOPHEN 1 TABLET: 5; 325 TABLET ORAL at 11:03

## 2018-11-02 RX ADMIN — Medication 3 ML: at 21:56

## 2018-11-02 RX ADMIN — MORPHINE SULFATE 1 MG: 2 INJECTION, SOLUTION INTRAMUSCULAR; INTRAVENOUS at 20:03

## 2018-11-02 RX ADMIN — SODIUM CHLORIDE 100 ML/HR: 9 INJECTION, SOLUTION INTRAVENOUS at 04:40

## 2018-11-02 RX ADMIN — TAZOBACTAM SODIUM AND PIPERACILLIN SODIUM 3.38 G: 375; 3 INJECTION, SOLUTION INTRAVENOUS at 21:55

## 2018-11-02 RX ADMIN — PROPRANOLOL HYDROCHLORIDE 10 MG: 10 TABLET ORAL at 15:35

## 2018-11-02 RX ADMIN — Medication 3 ML: at 11:04

## 2018-11-02 RX ADMIN — SODIUM CHLORIDE 100 ML/HR: 9 INJECTION, SOLUTION INTRAVENOUS at 15:35

## 2018-11-02 NOTE — PLAN OF CARE
Problem: Patient Care Overview  Goal: Plan of Care Review  Outcome: Ongoing (interventions implemented as appropriate)   11/02/18 0500   Coping/Psychosocial   Plan of Care Reviewed With patient   Plan of Care Review   Progress no change   OTHER   Outcome Summary Pt.VS WNL. C/o abd. pain at times, relieved by pain meds. Pt. had episode of NV early in shift, relieved by IV zofran. Pt. on IVFs and IV abx. Surgery and GI to see today. Pt. NPO with ice chips only. Pt. resting comfortably at present, will continue to monitor closely.     Goal: Individualization and Mutuality  Outcome: Ongoing (interventions implemented as appropriate)    Goal: Discharge Needs Assessment  Outcome: Ongoing (interventions implemented as appropriate)      Problem: Pain, Acute (Adult)  Goal: Acceptable Pain Control/Comfort Level  Outcome: Ongoing (interventions implemented as appropriate)      Problem: Infection, Risk/Actual (Adult)  Goal: Identify Related Risk Factors and Signs and Symptoms  Outcome: Outcome(s) achieved Date Met: 11/02/18    Goal: Infection Prevention/Resolution  Outcome: Ongoing (interventions implemented as appropriate)

## 2018-11-02 NOTE — CONSULTS
[facility]  Initial Inpatient Consult Note    Referring Provider: Dr Benites    Reason for Consultation: Crohn's disease    Subjective     History of present illness:    23 y.o. female with hx of Crohn's disease - initial diagnosis in childhood, not regularly followed by gastroenterologist.  Pt presents c/o increasing abdominal pain x 1-2 weeks.  Pain predominately in lower abdomen, R>L.  CT scan in ER shows segement of inflammed distal ileum with associated 2cm adjacent fluid collection suggestive of abscess.  Pt has been started on broad spectrum abx.  General surgery consult in pending.  Pt reports mother with hx of CD requiring ileocolectomy around age of 21.  Pt herself states she was initially dx at age 10 with CD.  She has not required any treatment or regular GI f/u for many years, and in fact tells me that the diagnosis of CD has been called in to question from time to time over the years.      Past Medical History:  Past Medical History:   Diagnosis Date   • Appendicitis     H/O SUBACUTE PERFORATED APPENDICITIS   • Asthma     RESCUE INHALER, HASN'T USED   • Crohn's disease (CMS/HCC)      Past Surgical History:  Past Surgical History:   Procedure Laterality Date   • APPENDECTOMY N/A 6/14/2016    Procedure: APPENDECTOMY LAPAROSCOPIC;  Surgeon: Donnie Carlson MD;  Location: [facility];  Service:    • TONSILLECTOMY        Social History:   Social History   Substance Use Topics   • Smoking status: Never Smoker   • Smokeless tobacco: Not on file   • Alcohol use No      Family History:  Family History   Problem Relation Age of Onset   • Cancer Maternal Grandfather    • COPD Maternal Grandfather    • Miscarriages / Stillbirths Mother    • Crohn's disease Mother        Home Meds:  Prescriptions Prior to Admission   Medication Sig Dispense Refill Last Dose   • lisdexamfetamine (VYVANSE) 40 MG capsule Take 40 mg by mouth Every Morning   10/31/2018 at Unknown time   • LO LOESTRIN FE 1  MG-10 MCG / 10 MCG tablet Take 1 tablet by mouth daily.   11/1/2018 at Unknown time   • propranolol (INDERAL) 10 MG tablet Take 10 mg by mouth Daily.   10/31/2018 at Unknown time     Current Meds:     influenza vaccine 0.5 mL Intramuscular Once   piperacillin-tazobactam 3.375 g Intravenous Q8H   sodium chloride 3 mL Intravenous Q12H     Allergies:  No Known Allergies  Review of Systems  All systems were reviewed and negative except for:  Constitution:  positive for chills  Gastrointestinal: postitive for  nausea and pain     Objective     Vital Signs  Temp:  [97.4 °F (36.3 °C)-99.8 °F (37.7 °C)] 98 °F (36.7 °C)  Heart Rate:  [] 67  Resp:  [16-18] 16  BP: ()/(54-84) 91/54  Physical Exam:  General Appearance:    Alert, cooperative, in no acute distress   Head:    Normocephalic, without obvious abnormality, atraumatic   Eyes:            Lids and lashes normal, conjunctivae and sclerae normal, no   icterus   Throat:   No oral lesions, no thrush, oral mucosa moist   Neck:   No adenopathy, supple, trachea midline, no thyromegaly, no   carotid bruit, no JVD   Lungs:     Clear to auscultation,respirations regular, even and                   unlabored    Heart:    Regular rhythm and normal rate, normal S1 and S2, no            murmur, no gallop, no rub, no click   Chest Wall:    No abnormalities observed   Abdomen:     Normal bowel sounds, no masses, no organomegaly, soft        TTP predominately R abdomen, no rebound/guarding                  Rectal:     Deferred   Extremities:   no edema, no cyanosis, no redness   Skin:   No bleeding, bruising or rash   Lymph nodes:   No palpable adenopathy   Psychiatric:  Judgement and insight: normal   Orientation to person place and time: normal   Mood and affect: normal   Results Review:   I reviewed the patient's new clinical results.  I reviewed the patient's new imaging results and agree with the interpretation.      Results from last 7 days  Lab Units 11/02/18  9176  11/01/18  1121   WBC 10*3/mm3 9.23 11.04*   HEMOGLOBIN g/dL 12.4 13.7   HEMATOCRIT % 37.6 44.0   PLATELETS 10*3/mm3 276 309       Results from last 7 days  Lab Units 11/02/18  0524 11/01/18  1213   SODIUM mmol/L 141 135*   POTASSIUM mmol/L 4.2 4.4   CHLORIDE mmol/L 104 104   CO2 mmol/L 24.0 17.5*   BUN mg/dL 6 7   CREATININE mg/dL 0.68 0.69   CALCIUM mg/dL 9.1 9.4   BILIRUBIN mg/dL  --  0.6   ALK PHOS U/L  --  76   ALT (SGPT) U/L  --  23   AST (SGOT) U/L  --  24   GLUCOSE mg/dL 82 76       Results from last 7 days  Lab Units 11/02/18  0524   INR  1.14*       Lab Results  Lab Value Date/Time   LIPASE 42 05/17/2016 1218       Radiology:  CT Abdomen Pelvis With Contrast   Final Result   There is acute terminal ileitis involving approximately 10   cm segment of terminal ileum. The approximately 2 cm thick-walled   mesenteric fluid collection adjacently likely represents an abscess.   There is also a tiny amount of free fluid.       Discussed with Dr. Rivas.       This report was finalized on 11/2/2018 7:50 AM by Dr. Kaylee Beckman M.D.              Assessment/Plan   Assessment:   1. Chron's disease of small bowel with complication  2.  Intraabdominal abscess secondary to above    Plan:   I agree with IV abx at this point  Will await surgical consultation with regards to need for possible IR guided drainage of abscess vs conservative management with IV abx alone.   Abscess appears to be rather small, pt is clearly non-toxic.    Will check HBV status and TB quantiferron in anticipation of eventual need for biologic agents.    Check [facility] IBD panel    Gi will continue to follow.      I discussed the patients findings and my recommendations with patient.         Vaibhav Meléndez M.D.  [facility]  [address] [place] [address]  Office: [phone]

## 2018-11-02 NOTE — PROGRESS NOTES
Name: Adrienne Rodrigues ADMIT: 2018   : 1995  PCP: Breezy Valentine MD    MRN: [id number] LOS: 1 days   AGE/SEX: 23 y.o. female  ROOM: [facility]   Subjective   No CP SOA. Nausea and vomiting after dilaudid. Controlled with zofran. No brbpr or diarrhea, no bowel movement since yesterday. Blood cultures were not drawn because she was not septic and had already received IV Zosyn upon arrival to the floor.    Objective   Vital Signs  Temp:  [97.4 °F (36.3 °C)-99.8 °F (37.7 °C)] 98 °F (36.7 °C)  Heart Rate:  [60-84] 67  Resp:  [16-18] 16  BP: ()/(54-84) 91/54  SpO2:  [94 %-100 %] 99 %  on   ;   Device (Oxygen Therapy): room air  Body mass index is 19.91 kg/m².    Physical Exam   Constitutional: She is oriented to person, place, and time. She appears well-developed. No distress.   HENT:   Head: Atraumatic.   Nose: Nose normal.   Eyes: Conjunctivae and EOM are normal.   Neck: Neck supple.   Cardiovascular: Normal rate, regular rhythm and intact distal pulses.    Pulmonary/Chest: Effort normal and breath sounds normal. No stridor. She has no wheezes.   Abdominal: Soft. She exhibits no distension. There is tenderness (RLQ).   Musculoskeletal: Normal range of motion. She exhibits no edema.   Neurological: She is alert and oriented to person, place, and time.   Skin: Skin is warm and dry. She is not diaphoretic.   Psychiatric: She has a normal mood and affect. Her behavior is normal.   Nursing note and vitals reviewed.      Results Review:       I reviewed the patient's new clinical results.      Results from last 7 days  Lab Units 18  0548 18  1121   WBC 10*3/mm3 9.23 11.04*   HEMOGLOBIN g/dL 12.4 13.7   PLATELETS 10*3/mm3 276 309     Results from last 7 days  Lab Units 18  0524 18  1213   SODIUM mmol/L 141 135*   POTASSIUM mmol/L 4.2 4.4   CHLORIDE mmol/L 104 104   CO2 mmol/L 24.0 17.5*   BUN mg/dL 6 7   CREATININE mg/dL 0.68 0.69   GLUCOSE mg/dL 82 76   Estimated Creatinine  Clearance: 122.7 mL/min (by C-G formula based on SCr of 0.68 mg/dL).  Results from last 7 days  Lab Units 11/02/18  0524 11/01/18  1213   CALCIUM mg/dL 9.1 9.4   ALBUMIN g/dL  --  3.60         influenza vaccine 0.5 mL Intramuscular Once   lisdexamfetamine 40 mg Oral Daily   piperacillin-tazobactam 3.375 g Intravenous Q8H   propranolol 10 mg Oral Daily   sodium chloride 3 mL Intravenous Q12H       Pharmacy to Dose Zosyn     sodium chloride 100 mL/hr Last Rate: 100 mL/hr (11/02/18 0440)   NPO Diet NPO Except: Ice Chips, Sips With Meds      Assessment/Plan      Active Hospital Problems    Diagnosis Date Noted   • **Exacerbation of Crohn's disease with abscess (CMS/[facility]) [K50.914] 11/01/2018   • Mild intermittent asthma without complication [J45.20] 11/01/2018      Resolved Hospital Problems    Diagnosis Date Noted Date Resolved   No resolved problems to display.     - Chron's Disease with Abscess without Bleeding: Continue Zosyn. Not septic. Will change to morphine prn and see if she better tolerates this for pain control. Oral medication has not had much effect, will increase. Continue zofran prn. Surgery to evaluate abscess. If no procedure planned, can start clears. GI following.  - Asthma: PRN albuterol. No AE on exam.  - ADD: Can resume home bblocker and vyvanse  - Disposition: TBD    Andi Benites MD  [facility]  11/02/18  12:03 PM

## 2018-11-02 NOTE — PLAN OF CARE
Problem: Patient Care Overview  Goal: Individualization and Mutuality  Outcome: Ongoing (interventions implemented as appropriate)    Goal: Discharge Needs Assessment  Outcome: Ongoing (interventions implemented as appropriate)    Goal: Interprofessional Rounds/Family Conf  Outcome: Ongoing (interventions implemented as appropriate)      Problem: Pain, Acute (Adult)  Goal: Acceptable Pain Control/Comfort Level  Outcome: Ongoing (interventions implemented as appropriate)      Problem: Infection, Risk/Actual (Adult)  Goal: Infection Prevention/Resolution  Outcome: Ongoing (interventions implemented as appropriate)      Problem: Patient Care Overview  Goal: Plan of Care Review  Outcome: Ongoing (interventions implemented as appropriate)   11/02/18 5481   Coping/Psychosocial   Plan of Care Reviewed With patient   Plan of Care Review   Progress improving   OTHER   Outcome Summary A&Ox4. Up adlib. GI and Surgery seen, plan on IV abx and slowing advancing diet. Clear liquids today. Pain med given x2. IVFs. VSS. Will continue to monitor. Family at bedside.      Goal: Individualization and Mutuality  Outcome: Ongoing (interventions implemented as appropriate)    Goal: Discharge Needs Assessment  Outcome: Ongoing (interventions implemented as appropriate)    Goal: Interprofessional Rounds/Family Conf  Outcome: Ongoing (interventions implemented as appropriate)

## 2018-11-02 NOTE — PROGRESS NOTES
Adult Nutrition  Assessment/PES    Patient Name:  Adrienne Rodrigues  YOB: 1995  MRN: [id number]  Admit Date:  11/1/2018    Assessment Date:  11/2/2018    Nutrition assessment triggered by MST score-4. Patient admitted with exacerbation of Chron's. Patient currently NPO.  RD to follow up to interview for nutritional needs.          Reason for Assessment     Row Name 11/02/18 1048          Reason for Assessment    Reason For Assessment identified at risk by screening criteria     Diagnosis   Primary Problem:  Exacerbation of Crohn's disease with abscess (CMS/HCC)      Identified At Risk by Screening Criteria MST SCORE 2+               Anthropometrics     Row Name 11/02/18 1049          Body Mass Index (BMI)    BMI Assessment BMI 18.5-24.9: normal             Labs/Tests/Procedures/Meds     Row Name 11/02/18 1049          Labs/Procedures/Meds    Lab Results Reviewed reviewed, pertinent        Diagnostic Tests/Procedures    Diagnostic Test/Procedure Reviewed reviewed, pertinent        Medications    Pertinent Medications Reviewed reviewed, pertinent     Pertinent Medications Comments NaCl             Physical Findings     Row Name 11/02/18 1049          Physical Findings    Overall Physical Appearance --   B=21             Estimated/Assessed Needs     Row Name 11/02/18 1049          Calculation Measurements    Weight Used For Calculations 59.4 kg (130 lb 15.3 oz)        Estimated/Assessed Needs    Additional Documentation KCAL/KG (Group);Protein Requirements (Group);Fluid Requirements (Group)        KCAL/KG                                                                kcal/kg (Specify) --   1614-1649        [place]-St. Jeor Equation    RMR ([place]-St. Jeor Equation) 1407.5        Protein Requirements    Est Protein Requirement Amount (gms/kg) 1.2 gm protein     Estimated Protein Requirements (gms/day) 71.28        Fluid Requirements    Estimated Fluid Requirements (mL/day) 1500     RDA Method (mL) 1500      Stanislaw Method (over 20 kg) 2688             Nutrition Prescription Ordered     Row Name 11/02/18 1050          Nutrition Prescription PO    Current PO Diet NPO             Evaluation of Received Nutrient/Fluid Intake     Row Name 11/02/18 1049          Calculation Measurements    Weight Used For Calculations 59.4 kg (130 lb 15.3 oz)             Evaluation of Prescribed Nutrient/Fluid Intake     Row Name 11/02/18 1049          Calculation Measurements    Weight Used For Calculations 59.4 kg (130 lb 15.3 oz)           Problem/Interventions:        Problem 1     Row Name 11/02/18 1050          Nutrition Diagnoses Problem 1    Problem 1 Inadequate Nutrient Intake     Etiology (related to) MNT for Treatment/Condition     Signs/Symptoms (evidenced by) NPO                     Intervention Goal     Row Name 11/02/18 1050          Intervention Goal    General Maintain nutrition;Meet nutritional needs for age/condition     PO Tolerate PO;Advance diet     Weight Maintain weight             Nutrition Intervention     Row Name 11/02/18 1050          Nutrition Intervention    RD/Tech Action Follow Tx progress;Care plan reviewd               Education/Evaluation     Row Name 11/02/18 1050          Education    Education Will Instruct as appropriate        Monitor/Evaluation    Monitor Per protocol     Education Follow-up Reinforce PRN         Electronically signed by:  Keila Rodríguez RD  11/02/18 10:50 AM

## 2018-11-02 NOTE — CONSULTS
General Surgery Consultation    Consulting Physician: Britney Kim MD  Referring Physician: Andi Benites MD    Reason for consultation: Crohn's ileitis with abscess    CC: Abdominal pain, weight loss    HPI:   The patient is a very pleasant 23 y.o. female that presented to the hospital with a 30 pound unintentional weight loss and right lower quadrant abdominal pain.  The weight loss has been ongoing for the last 2 years since she underwent a laparoscopic appendectomy for perforated appendicitis.  She has waxing and waning lower abdominal crampy pains that have been ongoing for many years but she says the current pain she has been experiencing have been ongoing and a more severe level for the last 2 weeks.  She also has waxing and waning diarrhea versus constipation, and very rarely has normal bowel movements in between.  She has not noticed any blood in her stool.  She has a history of Crohn's disease having been diagnosed at age 10 but has never been on any sort of maintenance medication.  Her last colonoscopy was done at age 10 when she was initially diagnosed with Crohn's disease.  She came to the emergency room where a CT of the abdomen and pelvis was done yesterday and showed Crohn's ileitis of the terminal ileum with a 2 cm abscess adjacent to the terminal ileum.  She has been admitted to the medicine service and placed on empiric antibiotics with GI following.  I was consulted to determine if surgical intervention would be necessary for her Crohn's ileitis with abscess.    Past Medical History:  Crohn's disease  Asthma    Past Surgical History:  Laparoscopic appendectomy (Dr. 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  Eyes: denies blurred or double vision; denies scleral icterus  Cardiovascular: denies chest pain, palpitations, edemas.  Respiratory: denies cough, sputum, SOB.  Gastrointestinal:  Positive for abdominal pain, constipation, and diarrhea; denies nausea, vomiting, melena, and hematochezia.  Genitourinary: denies dysuria, or hematuria.  Endocrine: denies cold intolerance, lethargy and flushing.  Hem: denies excessive bruising or bleeding.  Musculoskeletal: denies weakness, joint swelling, pain or stiffness.  Neuro: denies seizures, CVA, paresthesia, or peripheral neuropathy.   Skin: denies change in nevi, rashes, masses; denies jaundice     All other systems reviewed and were negative.    Physical Exam:   Vitals:    11/02/18 0744   BP: 91/54   Pulse: 67   Resp: 16   Temp: 98 °F (36.7 °C)   SpO2: 99%     GENERAL: awake and alert, no acute distress, oriented to person, place, and time  HEENT: normocephalic, atraumatic, no scleral icterus, moist mucous membranes.  NECK: Supple, there is no thyromegaly or lymphadenopathy  CHEST: clear to auscultation, no wheezes, rales or rhonchi, symmetric air entry  CARDIAC: regular rate and rhythm    ABDOMEN: soft, nondistended, focal RLQ tenderness with gaurding  EXTREMITIES: no cyanosis, clubbing, or edema   NEURO: alert and oriented, normal speech, cranial nerves 2-12 grossly intact, no focal deficits   SKIN: Moist, warm, no rashes, no jaundice.      Diagnostic workup:     Pertinent labs:     Results from last 7 days  Lab Units 11/02/18  0548 11/01/18  1121   WBC 10*3/mm3 9.23 11.04*   HEMOGLOBIN g/dL 12.4 13.7   HEMATOCRIT % 37.6 44.0   PLATELETS 10*3/mm3 276 309       Results from last 7 days  Lab Units 11/02/18  0524 11/01/18  1213   SODIUM mmol/L 141 135*   POTASSIUM mmol/L 4.2 4.4    CHLORIDE mmol/L 104 104   CO2 mmol/L 24.0 17.5*   BUN mg/dL 6 7   CREATININE mg/dL 0.68 0.69   CALCIUM mg/dL 9.1 9.4   BILIRUBIN mg/dL  --  0.6   ALK PHOS U/L  --  76   ALT (SGPT) U/L  --  23   AST (SGOT) U/L  --  24   GLUCOSE mg/dL 82 76       IMAGING:  CT ABD/PELVIS:  There is acute terminal ileitis involving approximately 10 cm segment of terminal ileum. The approximately 2 cm thick-walled mesenteric fluid collection adjacently likely represents an abscess. There is also a tiny amount of free fluid.    I personally have reviewed the above imaging and found the following additional findings: Focal area of terminal ileal inflammation with fat stranding and a very small abscess, not amenable to percutaneous drainage      Assessment and plan:     The patient is a 23 y.o. female with Crohn's ileitis with associated small abscess.     I reviewed her CT scan, and agree with IV antibiotics for now.  Her abscess is far too small for percutaneous drainage in radiology or surgical drainage in the OR.  This abscess will likely respond to IV/oral antibiotics and she will long-term need some sort of maintenance therapy for her Crohn's disease which I will of course defer to gastroenterology.  I think once she clinically begins to feel better we can start her on a steroid taper as well to help with her small bowel inflammation.  She can have clear liquids today and we will slowly advance her as tolerated through the weekend.  I anticipate she will at the earliest be ready for discharge by Monday on oral antibiotics if she is feeling subjectively better by then.  Thank you very much for this consult, I am happy to assist in this patient's care.    Britney Kim MD  General and Endoscopic Surgery  [facility]    [address], [address]  [place], [place], [address]  P: [phone]  F: [phone]

## 2018-11-03 LAB
ANION GAP SERPL CALCULATED.3IONS-SCNC: 13.1 MMOL/L
BUN BLD-MCNC: 6 MG/DL (ref 6–20)
BUN/CREAT SERPL: 9.1 (ref 7–25)
CALCIUM SPEC-SCNC: 8.6 MG/DL (ref 8.6–10.5)
CHLORIDE SERPL-SCNC: 104 MMOL/L (ref 98–107)
CO2 SERPL-SCNC: 20.9 MMOL/L (ref 22–29)
CREAT BLD-MCNC: 0.66 MG/DL (ref 0.57–1)
DEPRECATED RDW RBC AUTO: 39.6 FL (ref 37–54)
ERYTHROCYTE [DISTWIDTH] IN BLOOD BY AUTOMATED COUNT: 12.6 % (ref 11.7–13)
GFR SERPL CREATININE-BSD FRML MDRD: 113 ML/MIN/1.73
GLUCOSE BLD-MCNC: 72 MG/DL (ref 65–99)
HCT VFR BLD AUTO: 34.5 % (ref 35.6–45.5)
HGB BLD-MCNC: 11.5 G/DL (ref 11.9–15.5)
MCH RBC QN AUTO: 28.5 PG (ref 26.9–32)
MCHC RBC AUTO-ENTMCNC: 33.3 G/DL (ref 32.4–36.3)
MCV RBC AUTO: 85.6 FL (ref 80.5–98.2)
PLATELET # BLD AUTO: 288 10*3/MM3 (ref 140–500)
PMV BLD AUTO: 9.8 FL (ref 6–12)
POTASSIUM BLD-SCNC: 3.9 MMOL/L (ref 3.5–5.2)
RBC # BLD AUTO: 4.03 10*6/MM3 (ref 3.9–5.2)
SODIUM BLD-SCNC: 138 MMOL/L (ref 136–145)
WBC NRBC COR # BLD: 9.14 10*3/MM3 (ref 4.5–10.7)

## 2018-11-03 PROCEDURE — 25010000002 PIPERACILLIN SOD-TAZOBACTAM PER 1 G: Performed by: INTERNAL MEDICINE

## 2018-11-03 PROCEDURE — 25010000002 ONDANSETRON PER 1 MG: Performed by: INTERNAL MEDICINE

## 2018-11-03 PROCEDURE — 85027 COMPLETE CBC AUTOMATED: CPT | Performed by: INTERNAL MEDICINE

## 2018-11-03 PROCEDURE — 25010000002 HYDROMORPHONE PER 4 MG: Performed by: HOSPITALIST

## 2018-11-03 PROCEDURE — 25010000002 MORPHINE PER 10 MG: Performed by: INTERNAL MEDICINE

## 2018-11-03 PROCEDURE — 99231 SBSQ HOSP IP/OBS SF/LOW 25: CPT | Performed by: SURGERY

## 2018-11-03 PROCEDURE — 80048 BASIC METABOLIC PNL TOTAL CA: CPT | Performed by: INTERNAL MEDICINE

## 2018-11-03 PROCEDURE — 99232 SBSQ HOSP IP/OBS MODERATE 35: CPT | Performed by: INTERNAL MEDICINE

## 2018-11-03 RX ORDER — HYDROMORPHONE HYDROCHLORIDE 1 MG/ML
0.25 INJECTION, SOLUTION INTRAMUSCULAR; INTRAVENOUS; SUBCUTANEOUS
Status: DISCONTINUED | OUTPATIENT
Start: 2018-11-03 | End: 2018-11-06 | Stop reason: HOSPADM

## 2018-11-03 RX ADMIN — ONDANSETRON 4 MG: 2 INJECTION INTRAMUSCULAR; INTRAVENOUS at 13:34

## 2018-11-03 RX ADMIN — MORPHINE SULFATE 1 MG: 2 INJECTION, SOLUTION INTRAMUSCULAR; INTRAVENOUS at 13:34

## 2018-11-03 RX ADMIN — Medication 3 ML: at 08:40

## 2018-11-03 RX ADMIN — Medication 3 ML: at 20:06

## 2018-11-03 RX ADMIN — HYDROMORPHONE HYDROCHLORIDE 0.25 MG: 1 INJECTION, SOLUTION INTRAMUSCULAR; INTRAVENOUS; SUBCUTANEOUS at 18:57

## 2018-11-03 RX ADMIN — TAZOBACTAM SODIUM AND PIPERACILLIN SODIUM 3.38 G: 375; 3 INJECTION, SOLUTION INTRAVENOUS at 05:26

## 2018-11-03 RX ADMIN — HYDROMORPHONE HYDROCHLORIDE 0.25 MG: 1 INJECTION, SOLUTION INTRAMUSCULAR; INTRAVENOUS; SUBCUTANEOUS at 23:09

## 2018-11-03 RX ADMIN — SODIUM CHLORIDE 100 ML/HR: 9 INJECTION, SOLUTION INTRAVENOUS at 05:28

## 2018-11-03 RX ADMIN — HYDROCODONE BITARTRATE AND ACETAMINOPHEN 1 TABLET: 7.5; 325 TABLET ORAL at 20:06

## 2018-11-03 RX ADMIN — TAZOBACTAM SODIUM AND PIPERACILLIN SODIUM 3.38 G: 375; 3 INJECTION, SOLUTION INTRAVENOUS at 13:15

## 2018-11-03 RX ADMIN — PROPRANOLOL HYDROCHLORIDE 10 MG: 10 TABLET ORAL at 08:36

## 2018-11-03 RX ADMIN — TAZOBACTAM SODIUM AND PIPERACILLIN SODIUM 3.38 G: 375; 3 INJECTION, SOLUTION INTRAVENOUS at 22:13

## 2018-11-03 NOTE — PROGRESS NOTES
[facility]     LOS: 2 days     Name: Adrienne Rodrigues  Age: 23 y.o.  Sex: female  :  1995  MRN: [id number]         Primary Care Physician: Breezy Valentine MD    Diet Full Liquid    Subjective   pain only helped with dilaudid but made her too nauseated. would like to try with zofran. discussed smaller dose and she agreed.    Objective   Temp:  [96.4 °F (35.8 °C)-98.6 °F (37 °C)] 97 °F (36.1 °C)  Heart Rate:  [58-68] 60  Resp:  [16-18] 16  BP: ()/(55-68) 106/65  SpO2:  [99 %] 99 %  on   ;   Device (Oxygen Therapy): room air  Body mass index is 19.91 kg/m².    Physical Exam   Constitutional: She is oriented to person, place, and time. No distress.   Cardiovascular: Normal rate and regular rhythm.    Pulmonary/Chest: Effort normal and breath sounds normal. No respiratory distress.   Abdominal: Soft. Bowel sounds are normal. There is tenderness in the right lower quadrant.   Musculoskeletal: She exhibits no edema.   Neurological: She is alert and oriented to person, place, and time.   Skin: Skin is warm and dry.   Psychiatric: She has a normal mood and affect. Her behavior is normal.     Reviewed medications and new clinical results    influenza vaccine 0.5 mL Intramuscular Q24H   lisdexamfetamine 40 mg Oral Daily   Norethin-Eth Estrad-Fe Biphas 1 tablet Oral Daily   piperacillin-tazobactam 3.375 g Intravenous Q8H   propranolol 10 mg Oral Daily   sodium chloride 3 mL Intravenous Q12H     Pharmacy to Dose Zosyn     sodium chloride 75 mL/hr Last Rate: 75 mL/hr (18 1536)     Results from last 7 days  Lab Units 18  0642 18  0548 18  1121   WBC 10*3/mm3 9.14 9.23 11.04*   HEMOGLOBIN g/dL 11.5* 12.4 13.7   PLATELETS 10*3/mm3 288 276 309     Results from last 7 days  Lab Units 18  0642 18  0524 18  1213   SODIUM mmol/L 138 141 135*   POTASSIUM mmol/L 3.9 4.2 4.4   CHLORIDE mmol/L 104 104 104   CO2 mmol/L 20.9* 24.0  17.5*   BUN mg/dL 6 6 7   CREATININE mg/dL 0.66 0.68 0.69   CALCIUM mg/dL 8.6 9.1 9.4   GLUCOSE mg/dL 72 82 76     Lab Results   Component Value Date    ANIONGAP 13.1 11/03/2018     Estimated Creatinine Clearance: 126.4 mL/min (by C-G formula based on SCr of 0.66 mg/dL).    personally reviewed CT    Assessment/Plan   Active Hospital Problems    Diagnosis Date Noted   • **Exacerbation of Crohn's disease with abscess (CMS/[facility]) [K50.914] 11/01/2018   • Mild intermittent asthma without complication [J45.20] 11/01/2018      Resolved Hospital Problems    Diagnosis Date Noted Date Resolved   No resolved problems to display.     · crohn's disease, ileitis with intraabdominal abscess  · zosyn  · adjust IV pain meds: stop morphine, try small dose dilaudid PRN  · disposition  · TBD  · I discussed the patient's findings and my recommendations with patient and nursing staff.    Vitor Barry MD   11/03/18  6:35 PM

## 2018-11-03 NOTE — PROGRESS NOTES
Chief Complaint:    Crohn's ileocolitis with abscess    Subjective:    Pain is present, but improved.  No nausea with clear liquid diet.    Objective:    Vitals:    11/02/18 1652 11/02/18 1951 11/03/18 0504 11/03/18 0829   BP: 100/69 (!) 88/55 105/68 104/55   BP Location: Right arm Right arm Right arm Right arm   Patient Position: Lying Lying Lying Lying   Pulse: 66 68 62 58   Resp: 16 18 18 18   Temp: 97.9 °F (36.6 °C) 98.6 °F (37 °C) 96.4 °F (35.8 °C) 98.3 °F (36.8 °C)   TempSrc: Oral Oral Oral Oral   SpO2: 98% 99% 99% 99%   Weight:       Height:           Lungs: Clear  Heart: Regular  Abdomen: Soft. Tender right lower quadrant. Bowel sounds present.   Extremities: Warm    Labs reviewed.  WBC 9.14, Hgb 11.5  Creat 0.66    Assessment:    Crohn's ileocolitis with abscess    Plan:    I think okay to move diet up to a full liquid diet.  Continue IV Zosyn today.

## 2018-11-03 NOTE — PROGRESS NOTES
[facility]  Inpatient Progress Note    Reason for Follow Up:  Crohn's disease    Subjective     Interval History:   Pain stable/improving.  Still has some discomfort with sitting up.  Tolerating CLD    Current Facility-Administered Medications:   •  acetaminophen (TYLENOL) tablet 650 mg, 650 mg, Oral, Q4H PRN, Andi Benites MD  •  albuterol (PROVENTIL) nebulizer solution 0.083% 2.5 mg/3mL, 2.5 mg, Nebulization, Q6H PRN, Andi Benites MD  •  HYDROcodone-acetaminophen (NORCO) 7.5-325 MG per tablet 1 tablet, 1 tablet, Oral, Q4H PRN, Andi Benites MD, 1 tablet at 11/02/18 1543  •  Influenza Vac Subunit Quad (FLUCELVAX) injection 0.5 mL, 0.5 mL, Intramuscular, Q24H, Andi Benites MD  •  lisdexamfetamine (VYVANSE) capsule 40 mg, 40 mg, Oral, Daily, Andi Benites MD  •  morphine injection 1 mg, 1 mg, Intravenous, Q4H PRN, Andi Benites MD, 1 mg at 11/02/18 2003  •  [DISCONTINUED] HYDROmorphone (DILAUDID) injection 0.5 mg, 0.5 mg, Intravenous, Q2H PRN, 0.5 mg at 11/01/18 2242 **AND** naloxone (NARCAN) injection 0.4 mg, 0.4 mg, Intravenous, Q5 Min PRN, Andi Benites MD  •  Norethin-Eth Estrad-Fe Biphas 1 MG-10 MCG / 10 MCG tablet 1 tablet, 1 tablet, Oral, Daily, Andi Benites MD, 1 tablet at 11/03/18 0836  •  ondansetron (ZOFRAN) tablet 4 mg, 4 mg, Oral, Q6H PRN **OR** ondansetron ODT (ZOFRAN-ODT) disintegrating tablet 4 mg, 4 mg, Oral, Q6H PRN **OR** ondansetron (ZOFRAN) injection 4 mg, 4 mg, Intravenous, Q6H PRN, Andi Benites MD, 4 mg at 11/02/18 2305  •  Pharmacy to Dose Zosyn, , Does not apply, Continuous PRN, Andi Benites MD  •  piperacillin-tazobactam (ZOSYN) 3.375 g in iso-osmotic dextrose 50 ml (premix), 3.375 g, Intravenous, Q8H, Andi Benites MD, 3.375 g at 11/03/18 0526  •  propranolol (INDERAL) tablet 10 mg, 10 mg, Oral, Daily, Andi Benites MD, 10 mg at 11/03/18 0836  •  sodium chloride 0.9 % flush 3 mL, 3 mL,  Intravenous, Q12H, Andi Benites MD, 3 mL at 11/03/18 0840  •  sodium chloride 0.9 % flush 3-10 mL, 3-10 mL, Intravenous, PRN, Andi Benites MD  •  sodium chloride 0.9 % infusion, 100 mL/hr, Intravenous, Continuous, Andi Benites MD, Last Rate: 100 mL/hr at 11/03/18 0528, 100 mL/hr at 11/03/18 0528  Review of Systems:    All systems were reviewed and negative except for:  Gastrointestinal: postitive for  pain    Objective     Vital Signs  Temp:  [96.4 °F (35.8 °C)-98.6 °F (37 °C)] 98.3 °F (36.8 °C)  Heart Rate:  [58-68] 58  Resp:  [16-18] 18  BP: ()/(55-69) 104/55  Body mass index is 19.91 kg/m².    Intake/Output Summary (Last 24 hours) at 11/03/18 1046  Last data filed at 11/03/18 0841   Gross per 24 hour   Intake             2310 ml   Output             2050 ml   Net              260 ml     I/O this shift:  In: -   Out: 550 [Urine:550]     Physical Exam:   General: patient awake, alert and cooperative   Eyes: Normal lids and lashes, no scleral icterus   Neck: supple, normal ROM   Skin: warm and dry, not jaundiced   Cardiovascular: regular rhythm and rate, no murmurs auscultated   Pulm: clear to auscultation bilaterally, regular and unlabored   Abdomen: soft, nontender, nondistended; normal bowel sounds   Rectal: deferred   Extremities: no rash or edema   Psychiatric: Normal mood and behavior; memory intact     Results Review:     I reviewed the patient's new clinical results.      Results from last 7 days  Lab Units 11/03/18  0642 11/02/18  0548 11/01/18  1121   WBC 10*3/mm3 9.14 9.23 11.04*   HEMOGLOBIN g/dL 11.5* 12.4 13.7   HEMATOCRIT % 34.5* 37.6 44.0   PLATELETS 10*3/mm3 288 276 309       Results from last 7 days  Lab Units 11/03/18  0642 11/02/18  0524 11/01/18  1213   SODIUM mmol/L 138 141 135*   POTASSIUM mmol/L 3.9 4.2 4.4   CHLORIDE mmol/L 104 104 104   CO2 mmol/L 20.9* 24.0 17.5*   BUN mg/dL 6 6 7   CREATININE mg/dL 0.66 0.68 0.69   CALCIUM mg/dL 8.6 9.1 9.4   BILIRUBIN mg/dL  --    --  0.6   ALK PHOS U/L  --   --  76   ALT (SGPT) U/L  --   --  23   AST (SGOT) U/L  --   --  24   GLUCOSE mg/dL 72 82 76       Results from last 7 days  Lab Units 11/02/18  0524   INR  1.14*       Lab Results  Lab Value Date/Time   LIPASE 42 05/17/2016 1218       Radiology:  [unfilled]      Assessment/Plan   Assessment:   1. Chron's disease of small bowel with complication  2.  Intraabdominal abscess secondary to above    Plan:   Appreciate generally surgery attention to pt.  Hopefully can continue to be treated conservatively with IV abx.    I agree with addition of steroids when felt appropriate by surgery. I will plan to eventually initiate her on Entyvio as outpt once her abscess has resolved.      I discussed the patients findings and my recommendations with patient.         Vaibhav Meléndez M.D.  [facility]  [address] [place] [address]  Office: [phone]

## 2018-11-03 NOTE — PLAN OF CARE
Problem: Pain, Acute (Adult)  Goal: Acceptable Pain Control/Comfort Level  Outcome: Ongoing (interventions implemented as appropriate)      Problem: Infection, Risk/Actual (Adult)  Goal: Infection Prevention/Resolution  Outcome: Ongoing (interventions implemented as appropriate)      Problem: Patient Care Overview  Goal: Plan of Care Review  Outcome: Ongoing (interventions implemented as appropriate)   11/03/18 0516   Coping/Psychosocial   Plan of Care Reviewed With patient;family   Plan of Care Review   Progress no change   OTHER   Outcome Summary Alert and oriented. Ambulating independently. IV Zosyn Q8H. Clear liquid diet. Morphine x1. Zofran x1. Slightly hypotensive, but seems to be baseline. Family at bedside. Plan gathered from MD notes is to slowly advance diet, let abx resolve abscess, and at the earliest PO abx on Monday with possible discharge.

## 2018-11-03 NOTE — PLAN OF CARE
Problem: Pain, Acute (Adult)  Goal: Acceptable Pain Control/Comfort Level  Outcome: Ongoing (interventions implemented as appropriate)      Problem: Infection, Risk/Actual (Adult)  Goal: Infection Prevention/Resolution  Outcome: Ongoing (interventions implemented as appropriate)      Problem: Patient Care Overview  Goal: Plan of Care Review  Outcome: Ongoing (interventions implemented as appropriate)   11/03/18 1550   Coping/Psychosocial   Plan of Care Reviewed With patient;family   Plan of Care Review   Progress no change   OTHER   Outcome Summary Pt continues on IVABT. Fluids decreased to 75cc/hr. Diet upgraded to full liquids. Medicated with Morphine for pain and zofran due to nausea as a result of morphine. Up ad mj in room. Family remains at bedside. Will continue to monitor

## 2018-11-04 LAB
DEPRECATED RDW RBC AUTO: 38.7 FL (ref 37–54)
ERYTHROCYTE [DISTWIDTH] IN BLOOD BY AUTOMATED COUNT: 12.5 % (ref 11.7–13)
HCT VFR BLD AUTO: 35.6 % (ref 35.6–45.5)
HGB BLD-MCNC: 12 G/DL (ref 11.9–15.5)
MCH RBC QN AUTO: 28.5 PG (ref 26.9–32)
MCHC RBC AUTO-ENTMCNC: 33.7 G/DL (ref 32.4–36.3)
MCV RBC AUTO: 84.6 FL (ref 80.5–98.2)
PLATELET # BLD AUTO: 275 10*3/MM3 (ref 140–500)
PMV BLD AUTO: 9.7 FL (ref 6–12)
RBC # BLD AUTO: 4.21 10*6/MM3 (ref 3.9–5.2)
WBC NRBC COR # BLD: 7.53 10*3/MM3 (ref 4.5–10.7)

## 2018-11-04 PROCEDURE — 25010000002 HYDROMORPHONE PER 4 MG: Performed by: HOSPITALIST

## 2018-11-04 PROCEDURE — 25010000002 ONDANSETRON PER 1 MG: Performed by: INTERNAL MEDICINE

## 2018-11-04 PROCEDURE — 25010000002 PIPERACILLIN SOD-TAZOBACTAM PER 1 G: Performed by: INTERNAL MEDICINE

## 2018-11-04 PROCEDURE — 85027 COMPLETE CBC AUTOMATED: CPT | Performed by: SURGERY

## 2018-11-04 PROCEDURE — 99231 SBSQ HOSP IP/OBS SF/LOW 25: CPT | Performed by: SURGERY

## 2018-11-04 PROCEDURE — 99232 SBSQ HOSP IP/OBS MODERATE 35: CPT | Performed by: INTERNAL MEDICINE

## 2018-11-04 RX ORDER — SODIUM CHLORIDE, SODIUM LACTATE, POTASSIUM CHLORIDE, CALCIUM CHLORIDE 600; 310; 30; 20 MG/100ML; MG/100ML; MG/100ML; MG/100ML
75 INJECTION, SOLUTION INTRAVENOUS CONTINUOUS
Status: DISCONTINUED | OUTPATIENT
Start: 2018-11-04 | End: 2018-11-06

## 2018-11-04 RX ADMIN — SODIUM CHLORIDE, POTASSIUM CHLORIDE, SODIUM LACTATE AND CALCIUM CHLORIDE 75 ML/HR: 600; 310; 30; 20 INJECTION, SOLUTION INTRAVENOUS at 10:08

## 2018-11-04 RX ADMIN — Medication 3 ML: at 08:05

## 2018-11-04 RX ADMIN — TAZOBACTAM SODIUM AND PIPERACILLIN SODIUM 3.38 G: 375; 3 INJECTION, SOLUTION INTRAVENOUS at 13:22

## 2018-11-04 RX ADMIN — ONDANSETRON 4 MG: 2 INJECTION INTRAMUSCULAR; INTRAVENOUS at 15:08

## 2018-11-04 RX ADMIN — TAZOBACTAM SODIUM AND PIPERACILLIN SODIUM 3.38 G: 375; 3 INJECTION, SOLUTION INTRAVENOUS at 05:14

## 2018-11-04 RX ADMIN — SODIUM CHLORIDE 75 ML/HR: 9 INJECTION, SOLUTION INTRAVENOUS at 05:14

## 2018-11-04 RX ADMIN — Medication 3 ML: at 22:02

## 2018-11-04 RX ADMIN — TAZOBACTAM SODIUM AND PIPERACILLIN SODIUM 3.38 G: 375; 3 INJECTION, SOLUTION INTRAVENOUS at 21:46

## 2018-11-04 RX ADMIN — HYDROCODONE BITARTRATE AND ACETAMINOPHEN 1 TABLET: 7.5; 325 TABLET ORAL at 05:23

## 2018-11-04 RX ADMIN — HYDROCODONE BITARTRATE AND ACETAMINOPHEN 1 TABLET: 7.5; 325 TABLET ORAL at 19:18

## 2018-11-04 RX ADMIN — HYDROMORPHONE HYDROCHLORIDE 0.25 MG: 1 INJECTION, SOLUTION INTRAMUSCULAR; INTRAVENOUS; SUBCUTANEOUS at 05:14

## 2018-11-04 RX ADMIN — HYDROCODONE BITARTRATE AND ACETAMINOPHEN 1 TABLET: 7.5; 325 TABLET ORAL at 09:35

## 2018-11-04 RX ADMIN — HYDROMORPHONE HYDROCHLORIDE 0.25 MG: 1 INJECTION, SOLUTION INTRAMUSCULAR; INTRAVENOUS; SUBCUTANEOUS at 21:46

## 2018-11-04 RX ADMIN — PROPRANOLOL HYDROCHLORIDE 10 MG: 10 TABLET ORAL at 08:01

## 2018-11-04 NOTE — PROGRESS NOTES
[facility]  Inpatient Progress Note    Reason for Follow Up:  Crohn's disease    Subjective     Interval History:   Pain exacerbation overnight, improved this am after pain meds.      Current Facility-Administered Medications:   •  acetaminophen (TYLENOL) tablet 650 mg, 650 mg, Oral, Q4H PRN, Andi Benites MD  •  albuterol (PROVENTIL) nebulizer solution 0.083% 2.5 mg/3mL, 2.5 mg, Nebulization, Q6H PRN, Andi Benites MD  •  HYDROcodone-acetaminophen (NORCO) 7.5-325 MG per tablet 1 tablet, 1 tablet, Oral, Q4H PRN, Andi Benites MD, 1 tablet at 11/04/18 0935  •  HYDROmorphone (DILAUDID) injection 0.25 mg, 0.25 mg, Intravenous, Q2H PRN, Vitor Barry MD, 0.25 mg at 11/04/18 0514  •  Influenza Vac Subunit Quad (FLUCELVAX) injection 0.5 mL, 0.5 mL, Intramuscular, Q24H, Andi Benites MD  •  lactated ringers infusion, 75 mL/hr, Intravenous, Continuous, Vitor Barry MD, Last Rate: 75 mL/hr at 11/04/18 1008, 75 mL/hr at 11/04/18 1008  •  lisdexamfetamine (VYVANSE) capsule 40 mg, 40 mg, Oral, Daily, Andi Benites MD  •  [DISCONTINUED] HYDROmorphone (DILAUDID) injection 0.5 mg, 0.5 mg, Intravenous, Q2H PRN, 0.5 mg at 11/01/18 2242 **AND** naloxone (NARCAN) injection 0.4 mg, 0.4 mg, Intravenous, Q5 Min PRN, Andi Benites MD  •  Norethin-Eth Estrad-Fe Biphas 1 MG-10 MCG / 10 MCG tablet 1 tablet, 1 tablet, Oral, Daily, Andi Benites MD, 1 tablet at 11/04/18 0801  •  ondansetron (ZOFRAN) tablet 4 mg, 4 mg, Oral, Q6H PRN **OR** ondansetron ODT (ZOFRAN-ODT) disintegrating tablet 4 mg, 4 mg, Oral, Q6H PRN **OR** ondansetron (ZOFRAN) injection 4 mg, 4 mg, Intravenous, Q6H PRN, Andi Benites MD, 4 mg at 11/03/18 1334  •  Pharmacy to Dose Zosyn, , Does not apply, Continuous PRN, Andi Benites MD  •  piperacillin-tazobactam (ZOSYN) 3.375 g in iso-osmotic dextrose 50 ml (premix), 3.375 g, Intravenous, Q8H, Andi Benites MD, 3.375 g at 11/04/18  1322  •  propranolol (INDERAL) tablet 10 mg, 10 mg, Oral, Daily, Andi Benites MD, 10 mg at 11/04/18 0801  •  sodium chloride 0.9 % flush 3 mL, 3 mL, Intravenous, Q12H, Andi Benites MD, 3 mL at 11/04/18 0805  •  sodium chloride 0.9 % flush 3-10 mL, 3-10 mL, Intravenous, PRN, Andi Benites MD  Review of Systems:    All systems were reviewed and negative except for:  Gastrointestinal: postitive for  pain    Objective     Vital Signs  Temp:  [97 °F (36.1 °C)-97.9 °F (36.6 °C)] 97.7 °F (36.5 °C)  Heart Rate:  [57-79] 57  Resp:  [16] 16  BP: (104-114)/(58-66) 104/58  Body mass index is 19.91 kg/m².    Intake/Output Summary (Last 24 hours) at 11/04/18 1357  Last data filed at 11/04/18 1220   Gross per 24 hour   Intake              917 ml   Output             3100 ml   Net            -2183 ml     I/O this shift:  In: 340 [P.O.:220; I.V.:120]  Out: 1000 [Urine:1000]     Physical Exam:   General: patient awake, alert and cooperative   Abdomen: soft, TTP lower abdomen; normal bowel sounds   Rectal: deferred   Extremities: no rash or edema   Psychiatric: Normal mood and behavior; memory intact     Results Review:     I reviewed the patient's new clinical results.      Results from last 7 days  Lab Units 11/04/18  0506 11/03/18  0642 11/02/18  0548   WBC 10*3/mm3 7.53 9.14 9.23   HEMOGLOBIN g/dL 12.0 11.5* 12.4   HEMATOCRIT % 35.6 34.5* 37.6   PLATELETS 10*3/mm3 275 288 276       Results from last 7 days  Lab Units 11/03/18  0642 11/02/18  0524 11/01/18  1213   SODIUM mmol/L 138 141 135*   POTASSIUM mmol/L 3.9 4.2 4.4   CHLORIDE mmol/L 104 104 104   CO2 mmol/L 20.9* 24.0 17.5*   BUN mg/dL 6 6 7   CREATININE mg/dL 0.66 0.68 0.69   CALCIUM mg/dL 8.6 9.1 9.4   BILIRUBIN mg/dL  --   --  0.6   ALK PHOS U/L  --   --  76   ALT (SGPT) U/L  --   --  23   AST (SGOT) U/L  --   --  24   GLUCOSE mg/dL 72 82 76       Results from last 7 days  Lab Units 11/02/18  0524   INR  1.14*       Lab Results  Lab Value Date/Time    LIPASE 42 05/17/2016 1218     Assessment/Plan   Assessment:   1. Chron's disease of small bowel with complication  2.  Intraabdominal abscess secondary to above    Plan:   Appreciate generally surgery attention to pt.  Hopefully can continue to be treated conservatively with IV abx.    I agree with addition of steroids when felt appropriate by surgery. I will plan to eventually initiate her on Entyvio as outpt once her abscess has resolved.      I discussed the patients findings and my recommendations with patient.         Vaibhav Meléndez M.D.  [facility]  [address] [place] [address]  Office: [phone]

## 2018-11-04 NOTE — PROGRESS NOTES
Chief Complaint:    Crohn's ileocolitis with abscess    Subjective:    Had a particularly painful episode this morning, but better this afternoon.  Up to full liquid diet.    Objective:    Vitals:    11/03/18 1900 11/04/18 0438 11/04/18 0726 11/04/18 1515   BP: 114/66 104/58 104/58 103/56   BP Location: Right arm Right arm Left arm Right arm   Patient Position: Lying Lying Lying Lying   Pulse: 79 63 57 58   Resp: 16 16 16 16   Temp: 97.9 °F (36.6 °C) 97.3 °F (36.3 °C) 97.7 °F (36.5 °C) 96.9 °F (36.1 °C)   TempSrc: Oral Oral Oral Oral   SpO2: 99% 98% 99% 96%   Weight:       Height:           Lungs: Clear  Heart: Regular  Abdomen: Soft. Tender right lower quadrant. Bowel sounds present.   Extremities: Warm    Labs reviewed.  WBC 7.53, Hgb 12.0.    Assessment:    Crohn's ileocolitis with abscess    Plan:    Sems overall to be moving in the right direction, but no GI function yet, and still having pain, so I think we should continue IV Zosyn for now.

## 2018-11-04 NOTE — PLAN OF CARE
Problem: Patient Care Overview  Goal: Plan of Care Review  Outcome: Ongoing (interventions implemented as appropriate)   11/04/18 0537   Coping/Psychosocial   Plan of Care Reviewed With patient   Plan of Care Review   Progress no change   OTHER   Outcome Summary Pt had ok night. Had intermittent right sided abdominal pain, had iv dilaudid twice and norco twice. Slept well, but was waken up for morning labs and was crying from pain. Up ad mj. No nausea. Continue to monitor       Problem: Pain, Acute (Adult)  Goal: Acceptable Pain Control/Comfort Level  Outcome: Ongoing (interventions implemented as appropriate)      Problem: Infection, Risk/Actual (Adult)  Goal: Infection Prevention/Resolution  Outcome: Ongoing (interventions implemented as appropriate)

## 2018-11-04 NOTE — PLAN OF CARE
Problem: Pain, Acute (Adult)  Goal: Acceptable Pain Control/Comfort Level  Outcome: Ongoing (interventions implemented as appropriate)      Problem: Infection, Risk/Actual (Adult)  Goal: Infection Prevention/Resolution  Outcome: Ongoing (interventions implemented as appropriate)      Problem: Patient Care Overview  Goal: Plan of Care Review  Outcome: Ongoing (interventions implemented as appropriate)   11/04/18 1612   Coping/Psychosocial   Plan of Care Reviewed With patient   Plan of Care Review   Progress no change   OTHER   Outcome Summary Pt with increased paain this am. Reported pain medications did not change level of pain, Refused additional meds throughout the day. Medicated X1 for nausea. This pm pt denied pain. Per MD ok to add mashed potatoes to her full liquid diet. Hoping to go hme tomorrow. Encouraged to ambulate in [place]sario. Orders for SCD but refusing during the day. Will continue to monitor.

## 2018-11-04 NOTE — PROGRESS NOTES
[facility]     LOS: 3 days     Name: Adrienne Rodrigues  Age: 23 y.o.  Sex: female  :  1995  MRN: [id number]         Primary Care Physician: Breezy Valentine MD    Diet Full Liquid    Subjective   tolerated full liquid last pm. awoke with severe pain worse with movement hurt all over abdomen this morning better after pain medication.     Objective   Temp:  [97 °F (36.1 °C)-97.9 °F (36.6 °C)] 97.7 °F (36.5 °C)  Heart Rate:  [57-79] 57  Resp:  [16] 16  BP: (104-114)/(58-66) 104/58  SpO2:  [98 %-99 %] 99 %  on   ;   Device (Oxygen Therapy): room air  Body mass index is 19.91 kg/m².    Physical Exam   Constitutional: She is oriented to person, place, and time. No distress.   Cardiovascular: Normal rate and regular rhythm.    Pulmonary/Chest: Effort normal and breath sounds normal. No respiratory distress.   Abdominal: Soft. Bowel sounds are normal. There is generalized tenderness.   Musculoskeletal: She exhibits no edema.   Neurological: She is alert and oriented to person, place, and time.   Skin: Skin is warm and dry.   Psychiatric: She has a normal mood and affect. Her behavior is normal.     Reviewed medications and new clinical results    influenza vaccine 0.5 mL Intramuscular Q24H   lisdexamfetamine 40 mg Oral Daily   Norethin-Eth Estrad-Fe Biphas 1 tablet Oral Daily   piperacillin-tazobactam 3.375 g Intravenous Q8H   propranolol 10 mg Oral Daily   sodium chloride 3 mL Intravenous Q12H       Pharmacy to Dose Zosyn     sodium chloride 75 mL/hr Last Rate: 75 mL/hr (18 0514)       Results from last 7 days  Lab Units 18  0506 18  0642 18  0548 18  1121   WBC 10*3/mm3 7.53 9.14 9.23 11.04*   HEMOGLOBIN g/dL 12.0 11.5* 12.4 13.7   PLATELETS 10*3/mm3 275 288 276 309     Results from last 7 days  Lab Units 18  0642 18  0524 18  1213   SODIUM mmol/L 138 141 135*   POTASSIUM mmol/L 3.9 4.2 4.4   CHLORIDE mmol/L 104  104 104   CO2 mmol/L 20.9* 24.0 17.5*   BUN mg/dL 6 6 7   CREATININE mg/dL 0.66 0.68 0.69   CALCIUM mg/dL 8.6 9.1 9.4   GLUCOSE mg/dL 72 82 76     Lab Results   Component Value Date    ANIONGAP 13.1 11/03/2018     Estimated Creatinine Clearance: 126.4 mL/min (by C-G formula based on SCr of 0.66 mg/dL).    Assessment/Plan   Active Hospital Problems    Diagnosis Date Noted   • **Exacerbation of Crohn's disease with abscess (CMS/[facility]) [K50.914] 11/01/2018   • Mild intermittent asthma without complication [J45.20] 11/01/2018      Resolved Hospital Problems    Diagnosis Date Noted Date Resolved   No resolved problems to display.     · crohn's disease, ileitis with intraabdominal abscess  · zosyn  · surgery and GI following  · +  SCDs, encouraged ambulation  · disposition  · TBD  · I discussed the patient's findings and my recommendations with patient and family.    Vitor Barry MD   11/04/18  9:36 AM

## 2018-11-05 LAB
ANION GAP SERPL CALCULATED.3IONS-SCNC: 13.6 MMOL/L
BUN BLD-MCNC: 5 MG/DL (ref 6–20)
BUN/CREAT SERPL: 6.3 (ref 7–25)
CALCIUM SPEC-SCNC: 9.3 MG/DL (ref 8.6–10.5)
CHLORIDE SERPL-SCNC: 103 MMOL/L (ref 98–107)
CO2 SERPL-SCNC: 22.4 MMOL/L (ref 22–29)
CREAT BLD-MCNC: 0.8 MG/DL (ref 0.57–1)
DEPRECATED RDW RBC AUTO: 38.4 FL (ref 37–54)
ERYTHROCYTE [DISTWIDTH] IN BLOOD BY AUTOMATED COUNT: 12.4 % (ref 11.7–13)
GFR SERPL CREATININE-BSD FRML MDRD: 91 ML/MIN/1.73
GLUCOSE BLD-MCNC: 72 MG/DL (ref 65–99)
HCT VFR BLD AUTO: 35.5 % (ref 35.6–45.5)
HGB BLD-MCNC: 11.8 G/DL (ref 11.9–15.5)
MCH RBC QN AUTO: 28.2 PG (ref 26.9–32)
MCHC RBC AUTO-ENTMCNC: 33.2 G/DL (ref 32.4–36.3)
MCV RBC AUTO: 84.7 FL (ref 80.5–98.2)
PLATELET # BLD AUTO: 298 10*3/MM3 (ref 140–500)
PMV BLD AUTO: 9.8 FL (ref 6–12)
POTASSIUM BLD-SCNC: 3.9 MMOL/L (ref 3.5–5.2)
RBC # BLD AUTO: 4.19 10*6/MM3 (ref 3.9–5.2)
SODIUM BLD-SCNC: 139 MMOL/L (ref 136–145)
WBC NRBC COR # BLD: 6.29 10*3/MM3 (ref 4.5–10.7)

## 2018-11-05 PROCEDURE — 25010000002 PIPERACILLIN SOD-TAZOBACTAM PER 1 G: Performed by: INTERNAL MEDICINE

## 2018-11-05 PROCEDURE — 99232 SBSQ HOSP IP/OBS MODERATE 35: CPT | Performed by: INTERNAL MEDICINE

## 2018-11-05 PROCEDURE — 80048 BASIC METABOLIC PNL TOTAL CA: CPT | Performed by: HOSPITALIST

## 2018-11-05 PROCEDURE — 99232 SBSQ HOSP IP/OBS MODERATE 35: CPT | Performed by: SURGERY

## 2018-11-05 PROCEDURE — 85027 COMPLETE CBC AUTOMATED: CPT | Performed by: HOSPITALIST

## 2018-11-05 PROCEDURE — 25010000002 ONDANSETRON PER 1 MG: Performed by: INTERNAL MEDICINE

## 2018-11-05 RX ADMIN — TAZOBACTAM SODIUM AND PIPERACILLIN SODIUM 3.38 G: 375; 3 INJECTION, SOLUTION INTRAVENOUS at 20:46

## 2018-11-05 RX ADMIN — TAZOBACTAM SODIUM AND PIPERACILLIN SODIUM 3.38 G: 375; 3 INJECTION, SOLUTION INTRAVENOUS at 13:31

## 2018-11-05 RX ADMIN — ONDANSETRON 4 MG: 2 INJECTION INTRAMUSCULAR; INTRAVENOUS at 16:19

## 2018-11-05 RX ADMIN — Medication 3 ML: at 20:46

## 2018-11-05 RX ADMIN — PROPRANOLOL HYDROCHLORIDE 10 MG: 10 TABLET ORAL at 08:34

## 2018-11-05 RX ADMIN — SODIUM CHLORIDE, POTASSIUM CHLORIDE, SODIUM LACTATE AND CALCIUM CHLORIDE 75 ML/HR: 600; 310; 30; 20 INJECTION, SOLUTION INTRAVENOUS at 20:43

## 2018-11-05 RX ADMIN — TAZOBACTAM SODIUM AND PIPERACILLIN SODIUM 3.38 G: 375; 3 INJECTION, SOLUTION INTRAVENOUS at 05:12

## 2018-11-05 RX ADMIN — HYDROCODONE BITARTRATE AND ACETAMINOPHEN 1 TABLET: 7.5; 325 TABLET ORAL at 05:12

## 2018-11-05 NOTE — PROGRESS NOTES
"General Surgery  Progress Note    CC: Follow-up crohn's enteritis with abscess    S: Says her abdominal pain is remarkably better with only mild soreness remaining. Tearful right now because she just got a new IV placed and had to be stuck multiple times in the process. No nausea or vomiting today. Tolerating full liquids. No diarrhea, in fact no BM at all since she has been here.    ROS: Denies fevers, chills, or abdominal pain; Positive for constipation    O:/57 (BP Location: Left arm, Patient Position: Lying)   Pulse 60   Temp 97.3 °F (36.3 °C) (Oral)   Resp 16   Ht 172.7 cm (68\")   Wt 59.4 kg (130 lb 15.3 oz)   SpO2 99%   BMI 19.91 kg/m²       Intake & Output (last day)       11/04 0701 - 11/05 0700 11/05 0701 - 11/06 0700    P.O. 400 460    I.V. (mL/kg) 414 (7)     IV Piggyback 100 50    Total Intake(mL/kg) 914 (15.4) 510 (8.6)    Urine (mL/kg/hr) 3500 (2.5)     Total Output 3500      Net -2586 +510                  GENERAL: alert, well appearing, and in no distress  HEENT: normocephalic, atraumatic, moist mucous membranes, clear sclera   CHEST: clear to auscultation, no wheezes, rales or rhonchi, symmetric air entry  CARDIAC: regular rate and rhythm    ABDOMEN: soft, very mild tenderness in RLQ witout peritonitis  EXTREMITIES: no cyanosis, clubbing, or edema   SKIN: Warm and moist, no rashes    LABS    Results from last 7 days  Lab Units 11/05/18  0553 11/04/18  0506 11/03/18  0642   WBC 10*3/mm3 6.29 7.53 9.14   HEMOGLOBIN g/dL 11.8* 12.0 11.5*   HEMATOCRIT % 35.5* 35.6 34.5*   PLATELETS 10*3/mm3 298 275 288       Results from last 7 days  Lab Units 11/05/18  0553 11/03/18  0642 11/02/18  0524 11/01/18  1213   SODIUM mmol/L 139 138 141 135*   POTASSIUM mmol/L 3.9 3.9 4.2 4.4   CHLORIDE mmol/L 103 104 104 104   CO2 mmol/L 22.4 20.9* 24.0 17.5*   BUN mg/dL 5* 6 6 7   CREATININE mg/dL 0.80 0.66 0.68 0.69   CALCIUM mg/dL 9.3 8.6 9.1 9.4   BILIRUBIN mg/dL  --   --   --  0.6   ALK PHOS U/L  --   --   " --  76   ALT (SGPT) U/L  --   --   --  23   AST (SGOT) U/L  --   --   --  24   GLUCOSE mg/dL 72 72 82 76       Results from last 7 days  Lab Units 11/02/18  0524   INR  1.14*         A/P: 23 y.o. female with crohn's enteritis of the TI and adjacent small abscess.     She clinically looks much better today in comparison to last Friday and seems to be improving with IV Zosyn. I have advanced her to a regular diet and will plan to switch her to oral abx tomorrow. She could possibly even be discharged tomorrow if her WBC remains normal and she tolerates a regular diet. I see no reason to repeat a CT scan as she is clinically improving, afebrile, and has a normal WBC count.    Britney Kim MD  General and Endoscopic Surgery  [facility]    [address], [address]  [place], [place], [address]  P: [phone]  F: [phone]

## 2018-11-05 NOTE — PROGRESS NOTES
[facility]  Inpatient Progress Note    Reason for Follow Up:  Crohn's disease    Subjective     Interval History:   Pain is better. No BM but she is passing flatus with + bowel sounds. . She is tolerating full liquids and mashed potatoes.     Current Facility-Administered Medications:   •  acetaminophen (TYLENOL) tablet 650 mg, 650 mg, Oral, Q4H PRN, Andi Benites MD  •  albuterol (PROVENTIL) nebulizer solution 0.083% 2.5 mg/3mL, 2.5 mg, Nebulization, Q6H PRN, Andi Benites MD  •  HYDROcodone-acetaminophen (NORCO) 7.5-325 MG per tablet 1 tablet, 1 tablet, Oral, Q4H PRN, Andi Benites MD, 1 tablet at 11/05/18 0512  •  HYDROmorphone (DILAUDID) injection 0.25 mg, 0.25 mg, Intravenous, Q2H PRN, Vitor Barry MD, 0.25 mg at 11/04/18 2146  •  Influenza Vac Subunit Quad (FLUCELVAX) injection 0.5 mL, 0.5 mL, Intramuscular, Q24H, Andi Benites MD  •  lactated ringers infusion, 75 mL/hr, Intravenous, Continuous, Vitor Barry MD, Last Rate: 75 mL/hr at 11/04/18 1008, 75 mL/hr at 11/04/18 1008  •  lisdexamfetamine (VYVANSE) capsule 40 mg, 40 mg, Oral, Daily, Andi Benites MD  •  [DISCONTINUED] HYDROmorphone (DILAUDID) injection 0.5 mg, 0.5 mg, Intravenous, Q2H PRN, 0.5 mg at 11/01/18 2242 **AND** naloxone (NARCAN) injection 0.4 mg, 0.4 mg, Intravenous, Q5 Min PRN, Andi Benites MD  •  Norethin-Eth Estrad-Fe Biphas 1 MG-10 MCG / 10 MCG tablet 1 tablet, 1 tablet, Oral, Daily, Andi Benites MD, 1 tablet at 11/05/18 0835  •  ondansetron (ZOFRAN) tablet 4 mg, 4 mg, Oral, Q6H PRN **OR** ondansetron ODT (ZOFRAN-ODT) disintegrating tablet 4 mg, 4 mg, Oral, Q6H PRN **OR** ondansetron (ZOFRAN) injection 4 mg, 4 mg, Intravenous, Q6H PRN, nAdi Benites MD, 4 mg at 11/04/18 1508  •  Pharmacy to Dose Zosyn, , Does not apply, Continuous PRN, Andi Benites MD  •  piperacillin-tazobactam (ZOSYN) 3.375 g in iso-osmotic dextrose 50 ml (premix), 3.375 g,  Intravenous, Q8H, Andi Benites MD, 3.375 g at 11/05/18 0512  •  propranolol (INDERAL) tablet 10 mg, 10 mg, Oral, Daily, Andi Benites MD, 10 mg at 11/05/18 0834  •  sodium chloride 0.9 % flush 3 mL, 3 mL, Intravenous, Q12H, Andi Benites MD, 3 mL at 11/04/18 2202  •  sodium chloride 0.9 % flush 3-10 mL, 3-10 mL, Intravenous, PRN, Andi Benites MD  Review of Systems:    All systems were reviewed and negative except for:  Gastrointestinal: postitive for  bloating / distention    Objective     Vital Signs  Temp:  [96.9 °F (36.1 °C)-97.8 °F (36.6 °C)] 97.3 °F (36.3 °C)  Heart Rate:  [55-75] 60  Resp:  [16] 16  BP: (103-105)/(56-63) 105/57  Body mass index is 19.91 kg/m².    Intake/Output Summary (Last 24 hours) at 11/05/18 1303  Last data filed at 11/05/18 0849   Gross per 24 hour   Intake              614 ml   Output             2500 ml   Net            -1886 ml     I/O this shift:  In: 220 [P.O.:220]  Out: -      Physical Exam:   General: patient awake, alert and cooperative   Abdomen: soft, nontender, normal bowel sounds   Rectal: deferred   Extremities: no rash or edema   Psychiatric: Normal mood and behavior; memory intact     Results Review:     I reviewed the patient's new clinical results.      Results from last 7 days  Lab Units 11/05/18  0553 11/04/18  0506 11/03/18  0642   WBC 10*3/mm3 6.29 7.53 9.14   HEMOGLOBIN g/dL 11.8* 12.0 11.5*   HEMATOCRIT % 35.5* 35.6 34.5*   PLATELETS 10*3/mm3 298 275 288       Results from last 7 days  Lab Units 11/05/18  0553 11/03/18  0642 11/02/18  0524 11/01/18  1213   SODIUM mmol/L 139 138 141 135*   POTASSIUM mmol/L 3.9 3.9 4.2 4.4   CHLORIDE mmol/L 103 104 104 104   CO2 mmol/L 22.4 20.9* 24.0 17.5*   BUN mg/dL 5* 6 6 7   CREATININE mg/dL 0.80 0.66 0.68 0.69   CALCIUM mg/dL 9.3 8.6 9.1 9.4   BILIRUBIN mg/dL  --   --   --  0.6   ALK PHOS U/L  --   --   --  76   ALT (SGPT) U/L  --   --   --  23   AST (SGOT) U/L  --   --   --  24   GLUCOSE mg/dL 72 72 82  76       Results from last 7 days  Lab Units 11/02/18  0524   INR  1.14*       Lab Results  Lab Value Date/Time   LIPASE 42 05/17/2016 1218      AGUSTÍN MORALES WELLS        We are following for Crohn's disease    Constitutional: She is oriented to person, place, and time. She appears well-developed and well-nourished.   HENT: anicteric, no thyromegaly  Head: Normocephalic and atraumatic.   Eyes: Conjunctivae and EOM are normal.   Neck: Normal range of motion. No tracheal deviation present. Cardiovascular: Normal rate and regular rhythm.    Pulmonary/Chest: Effort normal and breath sounds normal. No respiratory distress.   Abdominal: Soft. Bowel sounds are normal. She exhibits no distension and no mass. There is no tenderness. There is no rebound and no guarding.   Musculoskeletal: Normal range of motion.   Neurological: She is alert and oriented to person, place, and time.   Skin: Skin is warm and dry.   Psychiatric: She has a normal mood and affect. Judgment normal.   Nursing note and vitals reviewed.    Assessment:   1. Chron's disease of small bowel with complication  2.  Intraabdominal abscess secondary to above    Plan:   Appreciate generally surgery attention to pt.     Continue IV abx.  Change to oral antibiotics tomorrow and likely discharge home   Initiate her on Entyvio as outpt once her abscess has resolved.  Probably 2-3 weeks   Ok to advance to low residue diet   We will get her an office visit in the next few weeks with Dr Meléndez

## 2018-11-05 NOTE — PROGRESS NOTES
[facility]     LOS: 4 days     Name: Adrienne Rodrigues  Age: 23 y.o.  Sex: female  :  1995  MRN: [id number]         Primary Care Physician: Breezy Valentine MD    Diet Full Liquid    Subjective   Pain is improved today. She is tolerating by mouth. no bowel movement yet but she is feeling like her bowels are getting ready to move.    Objective   Temp:  [96.9 °F (36.1 °C)-97.8 °F (36.6 °C)] 97.3 °F (36.3 °C)  Heart Rate:  [55-75] 60  Resp:  [16] 16  BP: (103-105)/(56-63) 105/57  SpO2:  [95 %-99 %] 99 %  on   ;   Device (Oxygen Therapy): room air  Body mass index is 19.91 kg/m².    Physical Exam   Constitutional: She is oriented to person, place, and time. No distress.   Cardiovascular: Normal rate and regular rhythm.    Pulmonary/Chest: Effort normal and breath sounds normal. No respiratory distress.   Abdominal: Soft. Bowel sounds are normal. There is no tenderness. There is no rebound and no guarding.   Musculoskeletal: She exhibits no edema.   Neurological: She is alert and oriented to person, place, and time.   Skin: Skin is warm and dry.   Psychiatric: She has a normal mood and affect. Her behavior is normal.     Reviewed medications and new clinical results    influenza vaccine 0.5 mL Intramuscular Q24H   lisdexamfetamine 40 mg Oral Daily   Norethin-Eth Estrad-Fe Biphas 1 tablet Oral Daily   piperacillin-tazobactam 3.375 g Intravenous Q8H   propranolol 10 mg Oral Daily   sodium chloride 3 mL Intravenous Q12H       lactated ringers 75 mL/hr Last Rate: 75 mL/hr (18 1008)   Pharmacy to Dose Zosyn         Results from last 7 days  Lab Units 18  0553 18  0506 18  0642 18  0548 18  1121   WBC 10*3/mm3 6.29 7.53 9.14 9.23 11.04*   HEMOGLOBIN g/dL 11.8* 12.0 11.5* 12.4 13.7   PLATELETS 10*3/mm3 298 275 288 276 309       Results from last 7 days  Lab Units 18  0553 18  0642 18  0524 18  1213    SODIUM mmol/L 139 138 141 135*   POTASSIUM mmol/L 3.9 3.9 4.2 4.4   CHLORIDE mmol/L 103 104 104 104   CO2 mmol/L 22.4 20.9* 24.0 17.5*   BUN mg/dL 5* 6 6 7   CREATININE mg/dL 0.80 0.66 0.68 0.69   CALCIUM mg/dL 9.3 8.6 9.1 9.4   GLUCOSE mg/dL 72 72 82 76     Lab Results   Component Value Date    ANIONGAP 13.6 11/05/2018     Estimated Creatinine Clearance: 104.3 mL/min (by C-G formula based on SCr of 0.8 mg/dL).    Assessment/Plan   Active Hospital Problems    Diagnosis Date Noted   • **Exacerbation of Crohn's disease with abscess (CMS/[facility]) [K50.914] 11/01/2018   • Mild intermittent asthma without complication [J45.20] 11/01/2018      Resolved Hospital Problems    Diagnosis Date Noted Date Resolved   No resolved problems to display.     · crohn's disease, ileitis with intraabdominal abscess  · improving on zosyn  · surgery and GI following  · ambulating  · disposition  · soon  · I discussed the patient's findings and my recommendations with patient and nursing staff.    Vitor Barry MD   11/05/18  11:11 AM

## 2018-11-05 NOTE — PLAN OF CARE
Problem: Pain, Acute (Adult)  Goal: Acceptable Pain Control/Comfort Level  Outcome: Ongoing (interventions implemented as appropriate)      Problem: Infection, Risk/Actual (Adult)  Goal: Infection Prevention/Resolution  Outcome: Ongoing (interventions implemented as appropriate)      Problem: Patient Care Overview  Goal: Plan of Care Review  Outcome: Ongoing (interventions implemented as appropriate)   11/05/18 1776   Coping/Psychosocial   Plan of Care Reviewed With patient;family   Plan of Care Review   Progress improving   OTHER   Outcome Summary Pt has not required any pain meds today. Zofran X1 but states nausea was pretty mild at the time. Still no BM but has been tolerating regular diet so far. Hopeful to go home soon. Up ad mj.      Goal: Individualization and Mutuality  Outcome: Ongoing (interventions implemented as appropriate)

## 2018-11-06 VITALS
DIASTOLIC BLOOD PRESSURE: 58 MMHG | SYSTOLIC BLOOD PRESSURE: 92 MMHG | BODY MASS INDEX: 19.85 KG/M2 | OXYGEN SATURATION: 99 % | HEART RATE: 60 BPM | HEIGHT: 68 IN | RESPIRATION RATE: 18 BRPM | WEIGHT: 130.95 LBS | TEMPERATURE: 97.1 F

## 2018-11-06 LAB
DEPRECATED RDW RBC AUTO: 39.8 FL (ref 37–54)
ERYTHROCYTE [DISTWIDTH] IN BLOOD BY AUTOMATED COUNT: 12.3 % (ref 11.7–13)
HCT VFR BLD AUTO: 42.7 % (ref 35.6–45.5)
HGB BLD-MCNC: 14 G/DL (ref 11.9–15.5)
MCH RBC QN AUTO: 29 PG (ref 26.9–32)
MCHC RBC AUTO-ENTMCNC: 32.8 G/DL (ref 32.4–36.3)
MCV RBC AUTO: 88.6 FL (ref 80.5–98.2)
PLATELET # BLD AUTO: 333 10*3/MM3 (ref 140–500)
PMV BLD AUTO: 9.5 FL (ref 6–12)
RBC # BLD AUTO: 4.82 10*6/MM3 (ref 3.9–5.2)
WBC NRBC COR # BLD: 6.03 10*3/MM3 (ref 4.5–10.7)

## 2018-11-06 PROCEDURE — 85027 COMPLETE CBC AUTOMATED: CPT | Performed by: HOSPITALIST

## 2018-11-06 PROCEDURE — 25010000002 PIPERACILLIN SOD-TAZOBACTAM PER 1 G: Performed by: INTERNAL MEDICINE

## 2018-11-06 PROCEDURE — 99232 SBSQ HOSP IP/OBS MODERATE 35: CPT | Performed by: INTERNAL MEDICINE

## 2018-11-06 RX ORDER — ONDANSETRON 4 MG/1
4 TABLET, FILM COATED ORAL EVERY 6 HOURS PRN
Qty: 10 TABLET | Refills: 0 | Status: SHIPPED | OUTPATIENT
Start: 2018-11-06 | End: 2018-11-20 | Stop reason: SDUPTHER

## 2018-11-06 RX ORDER — AMOXICILLIN AND CLAVULANATE POTASSIUM 875; 125 MG/1; MG/1
1 TABLET, FILM COATED ORAL EVERY 12 HOURS SCHEDULED
Status: DISCONTINUED | OUTPATIENT
Start: 2018-11-06 | End: 2018-11-06 | Stop reason: HOSPADM

## 2018-11-06 RX ORDER — DOCUSATE SODIUM 100 MG/1
100 CAPSULE, LIQUID FILLED ORAL 2 TIMES DAILY
Status: DISCONTINUED | OUTPATIENT
Start: 2018-11-06 | End: 2018-11-06 | Stop reason: HOSPADM

## 2018-11-06 RX ORDER — AMOXICILLIN AND CLAVULANATE POTASSIUM 875; 125 MG/1; MG/1
1 TABLET, FILM COATED ORAL EVERY 12 HOURS SCHEDULED
Qty: 20 TABLET | Refills: 0 | Status: SHIPPED | OUTPATIENT
Start: 2018-11-06 | End: 2018-11-16

## 2018-11-06 RX ADMIN — DOCUSATE SODIUM 100 MG: 100 CAPSULE, LIQUID FILLED ORAL at 08:28

## 2018-11-06 RX ADMIN — Medication 3 ML: at 08:26

## 2018-11-06 RX ADMIN — TAZOBACTAM SODIUM AND PIPERACILLIN SODIUM 3.38 G: 375; 3 INJECTION, SOLUTION INTRAVENOUS at 05:35

## 2018-11-06 NOTE — PROGRESS NOTES
[facility]  Inpatient Progress Note    Reason for Follow Up:  Crohn's disease    Subjective     Interval History:   Pain is better. She is tolerating diet. No bm    Current Facility-Administered Medications:   •  acetaminophen (TYLENOL) tablet 650 mg, 650 mg, Oral, Q4H PRN, Andi Benites MD  •  albuterol (PROVENTIL) nebulizer solution 0.083% 2.5 mg/3mL, 2.5 mg, Nebulization, Q6H PRN, Andi Benites MD  •  amoxicillin-clavulanate (AUGMENTIN) 875-125 MG per tablet 1 tablet, 1 tablet, Oral, Q12H, Britney Kim MD, Stopped at 11/06/18 0957  •  docusate sodium (COLACE) capsule 100 mg, 100 mg, Oral, BID, Britney Kim MD, 100 mg at 11/06/18 0828  •  HYDROcodone-acetaminophen (NORCO) 7.5-325 MG per tablet 1 tablet, 1 tablet, Oral, Q4H PRN, Andi Benites MD, 1 tablet at 11/05/18 0512  •  HYDROmorphone (DILAUDID) injection 0.25 mg, 0.25 mg, Intravenous, Q2H PRN, Vitor Barry MD, 0.25 mg at 11/04/18 2146  •  Influenza Vac Subunit Quad (FLUCELVAX) injection 0.5 mL, 0.5 mL, Intramuscular, Q24H, Andi Benites MD  •  lisdexamfetamine (VYVANSE) capsule 40 mg, 40 mg, Oral, Daily, Andi Benites MD  •  [DISCONTINUED] HYDROmorphone (DILAUDID) injection 0.5 mg, 0.5 mg, Intravenous, Q2H PRN, 0.5 mg at 11/01/18 2242 **AND** naloxone (NARCAN) injection 0.4 mg, 0.4 mg, Intravenous, Q5 Min PRN, Andi Benites MD  •  Norethin-Eth Estrad-Fe Biphas 1 MG-10 MCG / 10 MCG tablet 1 tablet, 1 tablet, Oral, Daily, Andi Benites MD, 1 tablet at 11/06/18 0828  •  ondansetron (ZOFRAN) tablet 4 mg, 4 mg, Oral, Q6H PRN **OR** ondansetron ODT (ZOFRAN-ODT) disintegrating tablet 4 mg, 4 mg, Oral, Q6H PRN **OR** ondansetron (ZOFRAN) injection 4 mg, 4 mg, Intravenous, Q6H PRN, Andi Benites MD, 4 mg at 11/05/18 1619  •  Pharmacy to Dose Zosyn, , Does not apply, Continuous PRN, Andi Benites MD  •  propranolol (INDERAL) tablet 10 mg, 10 mg, Oral, Daily, Delmis  Andi CHI MD, 10 mg at 11/05/18 0834  •  sodium chloride 0.9 % flush 3 mL, 3 mL, Intravenous, Q12H, Andi Benites MD, 3 mL at 11/06/18 0826  •  sodium chloride 0.9 % flush 3-10 mL, 3-10 mL, Intravenous, PRN, Andi Benites MD  Review of Systems:    All systems were reviewed and negative except for:  Gastrointestinal: postitive for  bloating / distention    Objective     Vital Signs  Temp:  [96.6 °F (35.9 °C)-99.1 °F (37.3 °C)] 97.1 °F (36.2 °C)  Heart Rate:  [56-68] 60  Resp:  [18] 18  BP: ()/(58-81) 92/58  Body mass index is 19.91 kg/m².    Intake/Output Summary (Last 24 hours) at 11/06/18 1144  Last data filed at 11/06/18 0830   Gross per 24 hour   Intake          1030.81 ml   Output             2000 ml   Net          -969.19 ml     I/O this shift:  In: 606.8 [I.V.:606.8]  Out: -      Physical Exam:   General: patient awake, alert and cooperative   Abdomen: soft, nontender, normal bowel sounds   Rectal: deferred   Extremities: no rash or edema   Psychiatric: Normal mood and behavior; memory intact     Results Review:     I reviewed the patient's new clinical results.      Results from last 7 days  Lab Units 11/06/18  0953 11/05/18  0553 11/04/18  0506   WBC 10*3/mm3 6.03 6.29 7.53   HEMOGLOBIN g/dL 14.0 11.8* 12.0   HEMATOCRIT % 42.7 35.5* 35.6   PLATELETS 10*3/mm3 333 298 275       Results from last 7 days  Lab Units 11/05/18  0553 11/03/18  0642 11/02/18  0524 11/01/18  1213   SODIUM mmol/L 139 138 141 135*   POTASSIUM mmol/L 3.9 3.9 4.2 4.4   CHLORIDE mmol/L 103 104 104 104   CO2 mmol/L 22.4 20.9* 24.0 17.5*   BUN mg/dL 5* 6 6 7   CREATININE mg/dL 0.80 0.66 0.68 0.69   CALCIUM mg/dL 9.3 8.6 9.1 9.4   BILIRUBIN mg/dL  --   --   --  0.6   ALK PHOS U/L  --   --   --  76   ALT (SGPT) U/L  --   --   --  23   AST (SGOT) U/L  --   --   --  24   GLUCOSE mg/dL 72 72 82 76       Results from last 7 days  Lab Units 11/02/18  0524   INR  1.14*       Lab Results  Lab Value Date/Time   LIPASE 42 05/17/2016  1218      AGUSTÍN WELLS      We are following for Crohn's disease    Constitutional: She is oriented to person, place, and time. She appears well-developed and well-nourished.   HENT: anicteric, no thyromegaly  Head: Normocephalic and atraumatic.   Eyes: Conjunctivae and EOM are normal.   Neck: Normal range of motion. No tracheal deviation present. Cardiovascular: Normal rate and regular rhythm.    Pulmonary/Chest: Effort normal and breath sounds normal. No respiratory distress.   Abdominal: Soft. Bowel sounds are normal. She exhibits no distension and no mass. There is no tenderness. There is no rebound and no guarding.   Musculoskeletal: Normal range of motion.   Neurological: She is alert and oriented to person, place, and time.   Skin: Skin is warm and dry.   Psychiatric: She has a normal mood and affect. Judgment normal.   Nursing note and vitals reviewed.       Assessment:   1. Chron's disease of small bowel with complication  2.  Intraabdominal abscess secondary to above    Plan:   Appreciate generally surgery attention to pt.     Continue oral antibiotics at discharge  Initiate her on Entyvio as outpt once her abscess has resolved.  Probably 2-3 weeks   Ok to advance to low residue diet   We will get her an office visit in the next few weeks with Dr Meléndez. Appt in December but will contact Dr. Meléndez regarding an earlier appt.   Ok with discharge

## 2018-11-06 NOTE — PLAN OF CARE
Problem: Pain, Acute (Adult)  Goal: Acceptable Pain Control/Comfort Level  Outcome: Ongoing (interventions implemented as appropriate)      Problem: Infection, Risk/Actual (Adult)  Goal: Infection Prevention/Resolution  Outcome: Ongoing (interventions implemented as appropriate)      Problem: Patient Care Overview  Goal: Plan of Care Review  Outcome: Ongoing (interventions implemented as appropriate)   11/06/18 1202   Coping/Psychosocial   Plan of Care Reviewed With patient   Plan of Care Review   Progress improving   OTHER   Outcome Summary c/o of minimal nausea, no medication required; no BM; denies pain; IV antibiotic to be changed to PO and probable discharge on 11/6

## 2018-11-06 NOTE — DISCHARGE SUMMARY
[facility]    Date of Discharge:  11/6/2018    PCP: Breezy Valentine MD    Discharge Diagnosis:   Active Hospital Problems    Diagnosis Date Noted   • **Exacerbation of Crohn's disease with abscess (CMS/[facility]) [K50.914] 11/01/2018   • Mild intermittent asthma without complication [J45.20] 11/01/2018      Resolved Hospital Problems    Diagnosis Date Noted Date Resolved   No resolved problems to display.      Consults     Date and Time Order Name Status Description    11/1/2018 1541 Inpatient General Surgery Consult Completed     11/1/2018 1539 Inpatient Gastroenterology Consult Completed     11/1/2018 2158 [facility] (on-call MD unless specified) Completed         Hospital Course  Please see history and physical for details. Patient is a 23 y.o. female with a history of Crohn's admitted with abdominal pain. CT scan showed acute terminal ileitis and approximately 2 cm thick-walled mesenteric fluid collection likely representing an abscess. She was started on IV Zosyn. GI and surgery were asked to see. Her abscess was far too small for percutaneous or surgical drainage. After a couple of days her symptoms improved. She had a bowel movement today. Her abdominal pain has resolved. Her antibiotics have been switched to by mouth. Her WBC is normal. GI is going to see her in the office they plan on starting her on Entyvio as an outpatient once her abscess has resolved possibly in 2-3 weeks.    I discussed the patient's findings and my recommendations with patient, family and nursing staff.    Condition on Discharge: Improved.     Temp:  [96.6 °F (35.9 °C)-99.1 °F (37.3 °C)] 97.1 °F (36.2 °C)  Heart Rate:  [56-68] 60  Resp:  [18] 18  BP: ()/(58-81) 92/58  Body mass index is 19.91 kg/m².    Physical Exam   Constitutional: She is oriented to person, place, and time. No distress.   Cardiovascular: Normal rate and regular rhythm.    Pulmonary/Chest: Effort normal and breath  sounds normal. No respiratory distress.   Abdominal: Soft. Bowel sounds are normal. There is no tenderness. There is no rebound and no guarding.   Musculoskeletal: She exhibits no edema.   Neurological: She is alert and oriented to person, place, and time.   Skin: Skin is warm and dry.   Psychiatric: She has a normal mood and affect. Her behavior is normal.        Discharge Medications      New Medications      Instructions Start Date   amoxicillin-clavulanate 875-125 MG per tablet  Commonly known as:  AUGMENTIN   1 tablet, Oral, Every 12 Hours Scheduled      ondansetron 4 MG tablet  Commonly known as:  ZOFRAN   4 mg, Oral, Every 6 Hours PRN         Continue These Medications      Instructions Start Date   LO LOESTRIN FE 1 MG-10 MCG / 10 MCG tablet  Generic drug:  Norethin-Eth Estrad-Fe Biphas   1 tablet, Oral, Daily      propranolol 10 MG tablet  Commonly known as:  INDERAL   10 mg, Oral, Daily      VYVANSE 40 MG capsule  Generic drug:  lisdexamfetamine   40 mg, Oral, Every Morning            Diet Instructions     Diet: Regular       Discharge Diet:  Regular         Activity Instructions     Activity as Tolerated            Additional Instructions for the Follow-ups that You Need to Schedule     Call MD for problems / concerns.    As directed        Follow-up Information     Breezy Valentine MD Follow up in 1 week(s).    Specialty:  Pediatrics  Contact information:  [address]  [facility] [address]  [facility] [id number] [phone]             Vaibhav Meléndez MD Follow up.    Specialties:  Gastroenterology, Internal Medicine  Contact information:  [address]  [address]LO[address]  [facility] [id number] [phone]                 Test Results Pending at Discharge   Order Current Status    IBD Sgi Diagnostic In process    QuantiFERON TB Gold In process         Vitor Barry MD  11/06/18  11:40 AM    Discharge time spent greater than 30 minutes.

## 2018-11-06 NOTE — PROGRESS NOTES
Discharge Planning Assessment  [facility]     Patient Name: Adrienne Rodrigues  MRN: [id number]  Today's Date: 11/6/2018    Admit Date: 11/1/2018          Discharge Needs Assessment     Row Name 11/06/18 1000       Living Environment    Lives With parent(s)    Current Living Arrangements home/apartment/condo    Primary Care Provided by self    Provides Primary Care For no one    Family Caregiver if Needed parent(s)    Quality of Family Relationships helpful;involved;supportive    Able to Return to Prior Arrangements yes       Resource/Environmental Concerns    Resource/Environmental Concerns none    Transportation Concerns car, none       Transition Planning    Patient/Family Anticipates Transition to home with family    Patient/Family Anticipated Services at Transition none    Transportation Anticipated family or friend will provide       Discharge Needs Assessment    Readmission Within the Last 30 Days no previous admission in last 30 days    Concerns to be Addressed denies needs/concerns at this time    Equipment Currently Used at Home none    Anticipated Changes Related to Illness none    Equipment Needed After Discharge none    Outpatient/Agency/Support Group Needs --   n/a    Discharge Facility/Level of Care Needs --   n/a    Offered/Gave Vendor List no    Current Discharge Risk chronically ill            Discharge Plan     Row Name 11/06/18 1001       Plan    Plan Home via private auto with no anticipated needs    Patient/Family in Agreement with Plan yes    Plan Comments Introduced self/explained role of CCP. Face sheet data updated. Pt lives at home with her parents. She is IADLs. Her PCP is Dr. Eddy, pediatrician, and pharmacy is [facility]. States no hx of DME/HH/SNF. States the only thing she may need for DC is medication for nausea PRN. States her parents will drive her home at DC. CCP to follow................JW        Destination     No service coordination in this encounter.      Durable  Medical Equipment     No service coordination in this encounter.      Dialysis/Infusion     No service coordination in this encounter.      Home Medical Care     No service coordination in this encounter.      Social Care     No service coordination in this encounter.                Demographic Summary     Row Name 11/06/18 1000       General Information    Admission Type inpatient    Arrived From home    Referral Source admission list    Reason for Consult discharge planning    Preferred Language English     Used During This Interaction no       Contact Information    Permission Granted to Share Info With ;family/designee            Functional Status     Row Name 11/06/18 1000       Functional Status    Usual Activity Tolerance good    Current Activity Tolerance fair       Functional Status, IADL    Medications assistive person    Meal Preparation assistive person    Housekeeping assistive person    Laundry assistive person    Shopping assistive person    IADL Comments lives at home with her parents       Mental Status    General Appearance WDL WDL            Psychosocial    No documentation.           Abuse/Neglect    No documentation.           Legal    No documentation.           Substance Abuse    No documentation.           Patient Forms    No documentation.         Bertha Sood, RN

## 2018-11-07 ENCOUNTER — READMISSION MANAGEMENT (OUTPATIENT)
Dept: CALL CENTER | Facility: HOSPITAL | Age: 23
End: 2018-11-07

## 2018-11-07 NOTE — OUTREACH NOTE
Prep Survey      Responses   Facility patient discharged from?  [facility]   Is patient eligible?  Yes   Discharge diagnosis  Exac of Crohn's disease with abscess, mild intermittent asthma without complications.   Does the patient have one of the following disease processes/diagnoses(primary or secondary)?  Other   Does the patient have Home health ordered?  No   Is there a DME ordered?  No   Comments regarding appointments  See AVS   Prep survey completed?  Yes          Saadia Espinoza RN

## 2018-11-08 ENCOUNTER — READMISSION MANAGEMENT (OUTPATIENT)
Dept: CALL CENTER | Facility: HOSPITAL | Age: 23
End: 2018-11-08

## 2018-11-08 LAB
QUANTIFERON CRITERIA: NORMAL
QUANTIFERON INCUBATION: NORMAL
QUANTIFERON MITOGEN VALUE: >10 IU/ML
QUANTIFERON NIL VALUE: 0.04 IU/ML
QUANTIFERON TB1 AG VALUE: 0.04 IU/ML
QUANTIFERON TB2 AG VALUE: 0.04 IU/ML
QUANTIFERON-TB GOLD PLUS: NEGATIVE

## 2018-11-08 NOTE — OUTREACH NOTE
Medical Week 1 Survey      Responses   Facility patient discharged from?  [place]   Does the patient have one of the following disease processes/diagnoses(primary or secondary)?  Other   Is there a successful TCM telephone encounter documented?  No   Week 1 attempt successful?  No   Unsuccessful attempts  Attempt 1          Hernandez Castro RN

## 2018-11-09 LAB — SPECIMEN STATUS: NORMAL

## 2018-11-11 ENCOUNTER — READMISSION MANAGEMENT (OUTPATIENT)
Dept: CALL CENTER | Facility: HOSPITAL | Age: 23
End: 2018-11-11

## 2018-11-11 NOTE — OUTREACH NOTE
Medical Week 1 Survey      Responses   Facility patient discharged from?  [place]   Does the patient have one of the following disease processes/diagnoses(primary or secondary)?  Other   Is there a successful TCM telephone encounter documented?  No   Week 1 attempt successful?  No   Unsuccessful attempts  Attempt 2          Andra Barrientos RN

## 2018-11-15 ENCOUNTER — READMISSION MANAGEMENT (OUTPATIENT)
Dept: CALL CENTER | Facility: HOSPITAL | Age: 23
End: 2018-11-15

## 2018-11-15 NOTE — OUTREACH NOTE
Medical Week 2 Survey      Responses   Facility patient discharged from?  [place]   Does the patient have one of the following disease processes/diagnoses(primary or secondary)?  Other   Week 2 attempt successful?  No   Unsuccessful attempts  Attempt 1          Shanon Dowd RN

## 2018-11-16 ENCOUNTER — READMISSION MANAGEMENT (OUTPATIENT)
Dept: CALL CENTER | Facility: HOSPITAL | Age: 23
End: 2018-11-16

## 2018-11-16 NOTE — OUTREACH NOTE
Medical Week 2 Survey      Responses   Facility patient discharged from?  [facility]   Does the patient have one of the following disease processes/diagnoses(primary or secondary)?  Other   Week 2 attempt successful?  No   Unsuccessful attempts  Attempt 2          Shanon Dowd RN

## 2018-11-20 ENCOUNTER — OFFICE VISIT (OUTPATIENT)
Dept: GASTROENTEROLOGY | Facility: CLINIC | Age: 23
End: 2018-11-20

## 2018-11-20 ENCOUNTER — TELEPHONE (OUTPATIENT)
Dept: GASTROENTEROLOGY | Facility: CLINIC | Age: 23
End: 2018-11-20

## 2018-11-20 VITALS
WEIGHT: 132.8 LBS | BODY MASS INDEX: 20.13 KG/M2 | HEIGHT: 68 IN | TEMPERATURE: 98.7 F | DIASTOLIC BLOOD PRESSURE: 70 MMHG | SYSTOLIC BLOOD PRESSURE: 100 MMHG

## 2018-11-20 DIAGNOSIS — R19.7 DIARRHEA, UNSPECIFIED TYPE: ICD-10-CM

## 2018-11-20 DIAGNOSIS — R10.30 LOWER ABDOMINAL PAIN: ICD-10-CM

## 2018-11-20 DIAGNOSIS — K50.914 EXACERBATION OF CROHN'S DISEASE WITH ABSCESS (HCC): Primary | ICD-10-CM

## 2018-11-20 LAB
ALBUMIN SERPL-MCNC: 4.3 G/DL (ref 3.5–5.2)
ALBUMIN/GLOB SERPL: 1.3 G/DL
ALP SERPL-CCNC: 74 U/L (ref 39–117)
ALT SERPL-CCNC: 9 U/L (ref 1–33)
AST SERPL-CCNC: 9 U/L (ref 1–32)
BASOPHILS # BLD AUTO: 0.03 10*3/MM3 (ref 0–0.2)
BASOPHILS NFR BLD AUTO: 0.4 % (ref 0–1.5)
BILIRUB SERPL-MCNC: 0.3 MG/DL (ref 0.1–1.2)
BUN SERPL-MCNC: 11 MG/DL (ref 6–20)
BUN/CREAT SERPL: 13.3 (ref 7–25)
CALCIUM SERPL-MCNC: 9.8 MG/DL (ref 8.6–10.5)
CHLORIDE SERPL-SCNC: 103 MMOL/L (ref 98–107)
CO2 SERPL-SCNC: 26.7 MMOL/L (ref 22–29)
CREAT SERPL-MCNC: 0.83 MG/DL (ref 0.57–1)
EOSINOPHIL # BLD AUTO: 0.1 10*3/MM3 (ref 0–0.7)
EOSINOPHIL NFR BLD AUTO: 1.2 % (ref 0.3–6.2)
ERYTHROCYTE [DISTWIDTH] IN BLOOD BY AUTOMATED COUNT: 13.2 % (ref 11.7–13)
GLOBULIN SER CALC-MCNC: 3.3 GM/DL
GLUCOSE SERPL-MCNC: 79 MG/DL (ref 65–99)
HCT VFR BLD AUTO: 40.3 % (ref 35.6–45.5)
HGB BLD-MCNC: 13.2 G/DL (ref 11.9–15.5)
IMM GRANULOCYTES # BLD: 0.01 10*3/MM3 (ref 0–0.03)
IMM GRANULOCYTES NFR BLD: 0.1 % (ref 0–0.5)
LYMPHOCYTES # BLD AUTO: 2.16 10*3/MM3 (ref 0.9–4.8)
LYMPHOCYTES NFR BLD AUTO: 25.4 % (ref 19.6–45.3)
MCH RBC QN AUTO: 28.3 PG (ref 26.9–32)
MCHC RBC AUTO-ENTMCNC: 32.8 G/DL (ref 32.4–36.3)
MCV RBC AUTO: 86.5 FL (ref 80.5–98.2)
MONOCYTES # BLD AUTO: 0.42 10*3/MM3 (ref 0.2–1.2)
MONOCYTES NFR BLD AUTO: 4.9 % (ref 5–12)
NEUTROPHILS # BLD AUTO: 5.78 10*3/MM3 (ref 1.9–8.1)
NEUTROPHILS NFR BLD AUTO: 68.1 % (ref 42.7–76)
PLATELET # BLD AUTO: 307 10*3/MM3 (ref 140–500)
POTASSIUM SERPL-SCNC: 4.5 MMOL/L (ref 3.5–5.2)
PROT SERPL-MCNC: 7.6 G/DL (ref 6–8.5)
RBC # BLD AUTO: 4.66 10*6/MM3 (ref 3.9–5.2)
SODIUM SERPL-SCNC: 139 MMOL/L (ref 136–145)
WBC # BLD AUTO: 8.49 10*3/MM3 (ref 4.5–10.7)

## 2018-11-20 PROCEDURE — 99214 OFFICE O/P EST MOD 30 MIN: CPT | Performed by: NURSE PRACTITIONER

## 2018-11-20 RX ORDER — DICYCLOMINE HYDROCHLORIDE 10 MG/1
10 CAPSULE ORAL 3 TIMES DAILY PRN
Qty: 90 CAPSULE | Refills: 1 | Status: SHIPPED | OUTPATIENT
Start: 2018-11-20 | End: 2019-05-14 | Stop reason: SDUPTHER

## 2018-11-20 RX ORDER — ONDANSETRON 4 MG/1
4 TABLET, FILM COATED ORAL EVERY 6 HOURS PRN
Qty: 10 TABLET | Refills: 3 | Status: SHIPPED | OUTPATIENT
Start: 2018-11-20 | End: 2019-05-14 | Stop reason: SDUPTHER

## 2018-11-20 NOTE — PROGRESS NOTES
Chief Complaint   Patient presents with   • Follow-up     crohns       Adrienne Rodrigues is a  23 y.o. female here for a hospital follow up visit for Crohn's disease.     HPI  24 yo f presents today accompanied by her mother for hospital follow up visit for Crohn's exacerbation with abdominal abscess. She is a patient of Dr. Meléndez. She was seen by our service at [facility] on 11/1/18. She had CT scan done that showed:    IMPRESSION:  There is acute terminal ileitis involving approximately 10  cm segment of terminal ileum. The approximately 2 cm thick-walled  mesenteric fluid collection adjacently likely represents an abscess.  There is also a tiny amount of free fluid.       She has hx Crohn's disease but never needed treatment for it in the past. She has FH Crohn's disease with her mother also having Crohn's. She was given IV Zosyn. Surgery was consulted and it was felt that her abdominal abscess was too small to require surgical intervention or drainage. WBC on admission was slightly elevated at 11.04. On discharge it was down to 6. Patient was improving and tolerated a bland diet and was discharged home on PO antibiotics. She admits since going home she continues to improve but she is still having some lower abd pain, diarrhea, gas and bloating. She admits she hasn't eaten perfectly and has paid the price for eating pizza or something but overall has tried to eat healthy. She has been avoiding salads and raw veggies. She denies any dysphagia, reflux, vomiting, constipation, rectal bleeding or melena. She has just a few days left of the antibiotics. She is also taking a probiotic. She admits she's hungry but she hasn't been able to eat a lot at one time due to the abd pain. Zofran seems to help with the nausea she has first thing in the morning after she takes the antibiotic. She did have labs done in the hospital (Hep B and Quantiferon GOLD) to get her ready to start the Entyvio once the  abscess is healed and she is feeling better.     Past Medical History:   Diagnosis Date   • Appendicitis     H/O SUBACUTE PERFORATED APPENDICITIS   • Asthma     RESCUE INHALER, HASN'T USED   • Crohn's disease (CMS/HCC)        Past Surgical History:   Procedure Laterality Date   • TONSILLECTOMY         Scheduled Meds:    Continuous Infusions:  No current facility-administered medications for this visit.     PRN Meds:.    No Known Allergies    Social History     Socioeconomic History   • Marital status: Single     Spouse name: Not on file   • Number of children: Not on file   • Years of education: Not on file   • Highest education level: Not on file   Social Needs   • Financial resource strain: Not on file   • Food insecurity - worry: Not on file   • Food insecurity - inability: Not on file   • Transportation needs - medical: Not on file   • Transportation needs - non-medical: Not on file   Occupational History   • Not on file   Tobacco Use   • Smoking status: Never Smoker   Substance and Sexual Activity   • Alcohol use: No   • Drug use: No   • Sexual activity: Defer   Other Topics Concern   • Not on file   Social History Narrative   • Not on file       Family History   Problem Relation Age of Onset   • Cancer Maternal Grandfather    • COPD Maternal Grandfather    • Miscarriages / Stillbirths Mother    • Crohn's disease Mother        Review of Systems   Constitutional: Positive for fatigue. Negative for appetite change, chills, diaphoresis, fever and unexpected weight change.   HENT: Negative for nosebleeds, postnasal drip, sore throat, trouble swallowing and voice change.    Respiratory: Negative for cough, choking, chest tightness, shortness of breath and wheezing.    Cardiovascular: Negative for chest pain.   Gastrointestinal: Positive for abdominal distention, abdominal pain, diarrhea and nausea. Negative for anal bleeding, blood in stool, constipation, rectal pain and vomiting.   Endocrine: Negative for  polydipsia, polyphagia and polyuria.   Musculoskeletal: Negative for gait problem.   Skin: Negative for rash and wound.   Allergic/Immunologic: Negative for food allergies.   Neurological: Negative for dizziness, speech difficulty and light-headedness.   Psychiatric/Behavioral: Negative for confusion, self-injury, sleep disturbance and suicidal ideas.       Vitals:    11/20/18 1536   BP: 100/70   Temp: 98.7 °F (37.1 °C)       Physical Exam   Constitutional: She is oriented to person, place, and time. She appears well-developed and well-nourished. She does not appear ill. No distress.   HENT:   Head: Normocephalic.   Eyes: Pupils are equal, round, and reactive to light.   Cardiovascular: Normal rate, regular rhythm and normal heart sounds.   Pulmonary/Chest: Effort normal and breath sounds normal.   Abdominal: Soft. Bowel sounds are normal. She exhibits no distension and no mass. There is no hepatosplenomegaly. There is tenderness. There is no rebound and no guarding. No hernia.       Musculoskeletal: Normal range of motion.   Neurological: She is alert and oriented to person, place, and time.   Skin: Skin is warm and dry.   Psychiatric: She has a normal mood and affect. Her speech is normal and behavior is normal. Judgment normal.       No images are attached to the encounter.    Assessment & Plan     1. Exacerbation of Crohn's disease with abscess (CMS/HCC)  - CBC & Differential  - Comprehensive Metabolic Panel    2. Diarrhea, unspecified type  - CBC & Differential  - Comprehensive Metabolic Panel    3. Lower abdominal pain    I reviewed hospital records with her today. She seems to be somewhat improved since coming home from the hospital. She is still taking the antibiotic. She is still having diarrhea with abd pain. She is trying to eat a GI soft/bland diet. Will give her some Bentyl to try for the abd pain and diarrhea. Will check labs today. [facility] [place] IBD testing was CONSISTENT with CROHN'S. Quantiferon gold  and Hep B were normal. Patient to follow up with Dr. Meléndez next month to discuss starting Entyvio.Adviised the patient to call with any issues. Patient to call office in 1-2 weeks with update.

## 2018-11-20 NOTE — TELEPHONE ENCOUNTER
----- Message from RUSSELL Nelson sent at 11/20/2018  4:11 PM EST -----  Can you help me find out if the patient had [facility]? Looks like drs ordered it in the hospital but I do not see the results. Thanks.

## 2018-11-28 ENCOUNTER — TELEPHONE (OUTPATIENT)
Dept: GASTROENTEROLOGY | Facility: CLINIC | Age: 23
End: 2018-11-28

## 2018-11-28 DIAGNOSIS — K50.919 CROHN'S DISEASE WITH COMPLICATION, UNSPECIFIED GASTROINTESTINAL TRACT LOCATION (HCC): ICD-10-CM

## 2018-11-28 DIAGNOSIS — K50.914 CROHN'S DISEASE WITH ABSCESS, UNSPECIFIED GASTROINTESTINAL TRACT LOCATION (HCC): Primary | ICD-10-CM

## 2018-11-28 NOTE — TELEPHONE ENCOUNTER
----- Message from RUSSELL Nelson sent at 11/28/2018  8:43 AM EST -----   Please call patient with recommendations on checking another CT scan before she sees Dr. Meléndez next month. If she is agreeable please order the CT abd/pelvis small bowel enterography protocol.     Thanks  Karlene   ----- Message -----  From: Vaibhav Meléndez MD  Sent: 11/27/2018   4:18 PM  To: RUSSELL Nelson    Yes I would order CT abdomen/pelvis small bowel enterography protocol  Thanks    ----- Message -----  From: Karlene Banda APRN  Sent: 11/20/2018   5:01 PM  To: Vaibhav Meléndez MD    Seen for [facility] f/u today. Doing better. Checking labs. Did you want another CT scan before you see her in December before starting her on Entyvio? I know the abdominal abscess was small but I wanted to make sure you didn't need another CT scan to show healing before you started Entyvio next month. If you did I was going to order it for you and make sure she has it done before she sees you next. Thanks.

## 2018-11-30 NOTE — TELEPHONE ENCOUNTER
Returned patient's phone call, she states she is having pain at the site of her abscess.Patient denies fever or blood in stool  Advised will place order for her CT scan and will send an update to Karlene. She verb understanding.

## 2018-12-05 ENCOUNTER — HOSPITAL ENCOUNTER (OUTPATIENT)
Dept: CT IMAGING | Facility: HOSPITAL | Age: 23
Discharge: HOME | End: 2018-12-05
Admitting: NURSE PRACTITIONER

## 2018-12-05 DIAGNOSIS — K50.914 CROHN'S DISEASE WITH ABSCESS, UNSPECIFIED GASTROINTESTINAL TRACT LOCATION (HCC): ICD-10-CM

## 2018-12-05 PROCEDURE — 25010000002 IOPAMIDOL 61 % SOLUTION: Performed by: NURSE PRACTITIONER

## 2018-12-05 PROCEDURE — 74177 CT ABD & PELVIS W/CONTRAST: CPT

## 2018-12-05 RX ADMIN — IOPAMIDOL 85 ML: 612 INJECTION, SOLUTION INTRAVENOUS at 17:48

## 2018-12-17 ENCOUNTER — OFFICE VISIT (OUTPATIENT)
Dept: GASTROENTEROLOGY | Facility: CLINIC | Age: 23
End: 2018-12-17

## 2018-12-17 VITALS
DIASTOLIC BLOOD PRESSURE: 70 MMHG | WEIGHT: 133.4 LBS | BODY MASS INDEX: 19.76 KG/M2 | HEIGHT: 69 IN | TEMPERATURE: 98.3 F | SYSTOLIC BLOOD PRESSURE: 106 MMHG

## 2018-12-17 DIAGNOSIS — K50.014 CROHN'S DISEASE OF SMALL INTESTINE WITH ABSCESS (HCC): Primary | ICD-10-CM

## 2018-12-17 PROCEDURE — 99214 OFFICE O/P EST MOD 30 MIN: CPT | Performed by: INTERNAL MEDICINE

## 2018-12-17 RX ORDER — PREDNISONE 20 MG/1
40 TABLET ORAL DAILY
Qty: 30 TABLET | Refills: 1 | Status: SHIPPED | OUTPATIENT
Start: 2018-12-17 | End: 2019-05-14

## 2018-12-17 RX ORDER — SODIUM CHLORIDE, SODIUM LACTATE, POTASSIUM CHLORIDE, CALCIUM CHLORIDE 600; 310; 30; 20 MG/100ML; MG/100ML; MG/100ML; MG/100ML
30 INJECTION, SOLUTION INTRAVENOUS CONTINUOUS
Status: CANCELLED | OUTPATIENT
Start: 2018-12-17

## 2018-12-17 NOTE — PROGRESS NOTES
Chief Complaint   Patient presents with   • GI Problem     h/o crohns   • Diarrhea     mucous, denies blood   • Abdominal Pain     generalized 4-7 on pain scale;  bloating   • Nausea     early satiety     Subjective     HPI     Adrienne Rodrigues is a 23 y.o. female who presents today for f/u  Recent hospitalization for RLQ pain, CT scan with distal ileitis with adjacent fluid collection/abscess.  She has responded relatively well to abx.  She does continue to have low grade abdominal pain and fluctuation of bowel habit.  She just recently had f/u cT scan with enterography protocol which showed near complete resolution of the abscess and overall improvement in the ileitis.  She is here to discuss long term management of her Crohn's disease.      Past Medical History:   Diagnosis Date   • Appendicitis     H/O SUBACUTE PERFORATED APPENDICITIS   • Asthma     RESCUE INHALER, HASN'T USED   • Crohn's disease (CMS/[facility])        Social History     Socioeconomic History   • Marital status: Single     Spouse name: Not on file   • Number of children: Not on file   • Years of education: Not on file   • Highest education level: Not on file   Tobacco Use   • Smoking status: Never Smoker   Substance and Sexual Activity   • Alcohol use: No   • Drug use: No   • Sexual activity: Defer         Current Outpatient Medications:   •  dicyclomine (BENTYL) 10 MG capsule, Take 1 capsule by mouth 3 (Three) Times a Day As Needed (abd pain and or diarrhea)., Disp: 90 capsule, Rfl: 1  •  lisdexamfetamine (VYVANSE) 40 MG capsule, Take 40 mg by mouth Every Morning, Disp: , Rfl:   •  LO LOESTRIN FE 1 MG-10 MCG / 10 MCG tablet, Take 1 tablet by mouth daily., Disp: , Rfl:   •  ondansetron (ZOFRAN) 4 MG tablet, Take 1 tablet by mouth Every 6 (Six) Hours As Needed for Nausea or Vomiting., Disp: 10 tablet, Rfl: 3  •  predniSONE (DELTASONE) 20 MG tablet, Take 2 tablets by mouth Daily., Disp: 30 tablet, Rfl: 1  •  propranolol (INDERAL) 10 MG tablet, Take 10 mg  by mouth Daily., Disp: , Rfl:     Review of Systems   Constitutional: Positive for fatigue.   Gastrointestinal: Positive for abdominal pain, constipation and diarrhea.       Objective   Vitals:    12/17/18 1538   BP: 106/70   Temp: 98.3 °F (36.8 °C)       Physical Exam   Constitutional: She is oriented to person, place, and time. She appears well-developed and well-nourished.   HENT:   Head: Normocephalic and atraumatic.   Abdominal: Soft. Bowel sounds are normal. She exhibits no distension and no mass. There is no tenderness. No hernia.   Neurological: She is alert and oriented to person, place, and time.   Skin: Skin is warm and dry.   Psychiatric: She has a normal mood and affect. Her behavior is normal. Judgment and thought content normal.   Vitals reviewed.      Assessment/Plan   Assessment:     1. Crohn's disease of small intestine with abscess (CMS/HCC)      Plan:   Start prednisone, 40mg/day x 1 week, then taper by 5mg/week  Entyvio PA has been submitted  Plan for colonscopy this Friday        Vaibhav Meléndez M.D.  [facility]  [address] [place] [address]  Office: [phone]

## 2018-12-17 NOTE — H&P (VIEW-ONLY)
Chief Complaint   Patient presents with   • GI Problem     h/o crohns   • Diarrhea     mucous, denies blood   • Abdominal Pain     generalized 4-7 on pain scale;  bloating   • Nausea     early satiety     Subjective     HPI     Adrienne Rodrigues is a 23 y.o. female who presents today for f/u  Recent hospitalization for RLQ pain, CT scan with distal ileitis with adjacent fluid collection/abscess.  She has responded relatively well to abx.  She does continue to have low grade abdominal pain and fluctuation of bowel habit.  She just recently had f/u cT scan with enterography protocol which showed near complete resolution of the abscess and overall improvement in the ileitis.  She is here to discuss long term management of her Crohn's disease.      Past Medical History:   Diagnosis Date   • Appendicitis     H/O SUBACUTE PERFORATED APPENDICITIS   • Asthma     RESCUE INHALER, HASN'T USED   • Crohn's disease (CMS/[facility])        Social History     Socioeconomic History   • Marital status: Single     Spouse name: Not on file   • Number of children: Not on file   • Years of education: Not on file   • Highest education level: Not on file   Tobacco Use   • Smoking status: Never Smoker   Substance and Sexual Activity   • Alcohol use: No   • Drug use: No   • Sexual activity: Defer         Current Outpatient Medications:   •  dicyclomine (BENTYL) 10 MG capsule, Take 1 capsule by mouth 3 (Three) Times a Day As Needed (abd pain and or diarrhea)., Disp: 90 capsule, Rfl: 1  •  lisdexamfetamine (VYVANSE) 40 MG capsule, Take 40 mg by mouth Every Morning, Disp: , Rfl:   •  LO LOESTRIN FE 1 MG-10 MCG / 10 MCG tablet, Take 1 tablet by mouth daily., Disp: , Rfl:   •  ondansetron (ZOFRAN) 4 MG tablet, Take 1 tablet by mouth Every 6 (Six) Hours As Needed for Nausea or Vomiting., Disp: 10 tablet, Rfl: 3  •  predniSONE (DELTASONE) 20 MG tablet, Take 2 tablets by mouth Daily., Disp: 30 tablet, Rfl: 1  •  propranolol (INDERAL) 10 MG tablet, Take 10 mg  by mouth Daily., Disp: , Rfl:     Review of Systems   Constitutional: Positive for fatigue.   Gastrointestinal: Positive for abdominal pain, constipation and diarrhea.       Objective   Vitals:    12/17/18 1538   BP: 106/70   Temp: 98.3 °F (36.8 °C)       Physical Exam   Constitutional: She is oriented to person, place, and time. She appears well-developed and well-nourished.   HENT:   Head: Normocephalic and atraumatic.   Abdominal: Soft. Bowel sounds are normal. She exhibits no distension and no mass. There is no tenderness. No hernia.   Neurological: She is alert and oriented to person, place, and time.   Skin: Skin is warm and dry.   Psychiatric: She has a normal mood and affect. Her behavior is normal. Judgment and thought content normal.   Vitals reviewed.      Assessment/Plan   Assessment:     1. Crohn's disease of small intestine with abscess (CMS/HCC)      Plan:   Start prednisone, 40mg/day x 1 week, then taper by 5mg/week  Entyvio PA has been submitted  Plan for colonscopy this Friday        Vaibhav Meléndez M.D.  [facility]  [address] [place] [address]  Office: [phone]

## 2018-12-18 ENCOUNTER — TELEPHONE (OUTPATIENT)
Dept: GASTROENTEROLOGY | Facility: CLINIC | Age: 23
End: 2018-12-18

## 2018-12-18 NOTE — TELEPHONE ENCOUNTER
Verbal message given to Rosario Rocha MA for the need of an Entyvio PA. She verb understanding and will start paperwork today.

## 2018-12-21 ENCOUNTER — ANESTHESIA (OUTPATIENT)
Dept: GASTROENTEROLOGY | Facility: HOSPITAL | Age: 23
End: 2018-12-21

## 2018-12-21 ENCOUNTER — TELEPHONE (OUTPATIENT)
Dept: GASTROENTEROLOGY | Facility: CLINIC | Age: 23
End: 2018-12-21

## 2018-12-21 ENCOUNTER — ANESTHESIA EVENT (OUTPATIENT)
Dept: GASTROENTEROLOGY | Facility: HOSPITAL | Age: 23
End: 2018-12-21

## 2018-12-21 ENCOUNTER — HOSPITAL ENCOUNTER (OUTPATIENT)
Facility: HOSPITAL | Age: 23
Setting detail: HOSPITAL OUTPATIENT SURGERY
Discharge: HOME | End: 2018-12-21
Attending: INTERNAL MEDICINE | Admitting: INTERNAL MEDICINE

## 2018-12-21 VITALS
HEART RATE: 67 BPM | SYSTOLIC BLOOD PRESSURE: 136 MMHG | WEIGHT: 131.7 LBS | DIASTOLIC BLOOD PRESSURE: 74 MMHG | RESPIRATION RATE: 16 BRPM | TEMPERATURE: 98.1 F | OXYGEN SATURATION: 98 % | HEIGHT: 69 IN | BODY MASS INDEX: 19.51 KG/M2

## 2018-12-21 DIAGNOSIS — K50.014 CROHN'S DISEASE OF SMALL INTESTINE WITH ABSCESS (HCC): ICD-10-CM

## 2018-12-21 LAB
B-HCG UR QL: NEGATIVE
INTERNAL NEGATIVE CONTROL: NEGATIVE
INTERNAL POSITIVE CONTROL: POSITIVE
Lab: NORMAL

## 2018-12-21 PROCEDURE — 81025 URINE PREGNANCY TEST: CPT | Performed by: INTERNAL MEDICINE

## 2018-12-21 PROCEDURE — 45380 COLONOSCOPY AND BIOPSY: CPT | Performed by: INTERNAL MEDICINE

## 2018-12-21 PROCEDURE — 25010000002 PROPOFOL 10 MG/ML EMULSION: Performed by: ANESTHESIOLOGY

## 2018-12-21 PROCEDURE — 88305 TISSUE EXAM BY PATHOLOGIST: CPT | Performed by: INTERNAL MEDICINE

## 2018-12-21 RX ORDER — PROPOFOL 10 MG/ML
VIAL (ML) INTRAVENOUS AS NEEDED
Status: DISCONTINUED | OUTPATIENT
Start: 2018-12-21 | End: 2018-12-21 | Stop reason: SURG

## 2018-12-21 RX ORDER — LIDOCAINE HYDROCHLORIDE 20 MG/ML
INJECTION, SOLUTION INFILTRATION; PERINEURAL AS NEEDED
Status: DISCONTINUED | OUTPATIENT
Start: 2018-12-21 | End: 2018-12-21 | Stop reason: SURG

## 2018-12-21 RX ORDER — SODIUM CHLORIDE, SODIUM LACTATE, POTASSIUM CHLORIDE, CALCIUM CHLORIDE 600; 310; 30; 20 MG/100ML; MG/100ML; MG/100ML; MG/100ML
30 INJECTION, SOLUTION INTRAVENOUS CONTINUOUS
Status: DISCONTINUED | OUTPATIENT
Start: 2018-12-21 | End: 2018-12-21 | Stop reason: HOSPADM

## 2018-12-21 RX ORDER — PREDNISONE 10 MG/1
TABLET ORAL
Qty: 126 TABLET | Refills: 0 | OUTPATIENT
Start: 2018-12-21 | End: 2019-05-14

## 2018-12-21 RX ADMIN — LIDOCAINE HYDROCHLORIDE 100 MG: 20 INJECTION, SOLUTION INFILTRATION; PERINEURAL at 11:40

## 2018-12-21 RX ADMIN — SODIUM CHLORIDE, POTASSIUM CHLORIDE, SODIUM LACTATE AND CALCIUM CHLORIDE: 600; 310; 30; 20 INJECTION, SOLUTION INTRAVENOUS at 11:40

## 2018-12-21 RX ADMIN — PROPOFOL 400 MG: 10 INJECTION, EMULSION INTRAVENOUS at 11:40

## 2018-12-21 RX ADMIN — SODIUM CHLORIDE, POTASSIUM CHLORIDE, SODIUM LACTATE AND CALCIUM CHLORIDE 30 ML/HR: 600; 310; 30; 20 INJECTION, SOLUTION INTRAVENOUS at 11:36

## 2018-12-21 NOTE — ANESTHESIA PREPROCEDURE EVALUATION
Anesthesia Evaluation     Patient summary reviewed and Nursing notes reviewed   NPO Solid Status: > 8 hours  NPO Liquid Status: > 8 hours           Airway   Mallampati: II  TM distance: >3 FB  Neck ROM: full  no difficulty expected  Dental - normal exam     Pulmonary - normal exam   (+) asthma,   Cardiovascular - normal exam        Neuro/Psych  GI/Hepatic/Renal/Endo      Musculoskeletal     Abdominal  - normal exam   Substance History      OB/GYN          Other                        Anesthesia Plan    ASA 2     MAC     Anesthetic plan, all risks, benefits, and alternatives have been provided, discussed and informed consent has been obtained with: patient.

## 2018-12-21 NOTE — ANESTHESIA POSTPROCEDURE EVALUATION
"Patient: Adrienne Rodrigues    Procedure Summary     Date:  12/21/18 Room / Location:   [facility] /  [facility]    Anesthesia Start:  1139 Anesthesia Stop:  1211    Procedure:  COLONOSCOPY TO TERMINAL ILEUM WITH BX (N/A ) Diagnosis:       Crohn's disease of small intestine with abscess (CMS/HCC)      (Crohn's disease of small intestine with abscess (CMS/HCC) [K50.014])    Surgeon:  Vaibhav Meléndez MD Provider:  Henry Marti MD    Anesthesia Type:  MAC ASA Status:  2          Anesthesia Type: MAC  Last vitals  BP   112/64 (12/21/18 1130)   Temp   36.7 °C (98 °F) (12/21/18 1130)   Pulse   66 (12/21/18 1130)   Resp   14 (12/21/18 1130)     SpO2   100 % (12/21/18 1130)     Post Anesthesia Care and Evaluation    Patient location during evaluation: bedside  Patient participation: complete - patient participated  Level of consciousness: awake and alert  Pain management: adequate  Airway patency: patent  Anesthetic complications: No anesthetic complications    Cardiovascular status: acceptable  Respiratory status: acceptable  Hydration status: acceptable    Comments: /64 (BP Location: Left arm, Patient Position: Sitting)   Pulse 66   Temp 36.7 °C (98 °F) (Oral)   Resp 14   Ht 174 cm (68.5\")   Wt 59.7 kg (131 lb 11.2 oz)   LMP 12/14/2018 (Exact Date)   SpO2 100%   BMI 19.73 kg/m²       "

## 2018-12-21 NOTE — TELEPHONE ENCOUNTER
----- Message from Vaibhav Meléndez MD sent at 12/21/2018 11:41 AM EST -----  Regarding: Steroid taper  Can we please send steroid taper to pts pharmacy  She is currently on 40mg/day    On Monday the 24th, she should decrease to 30mg/day, then decrease by 10mg/week until taper completed.

## 2018-12-24 LAB
CYTO UR: NORMAL
LAB AP CASE REPORT: NORMAL
PATH REPORT.FINAL DX SPEC: NORMAL
PATH REPORT.GROSS SPEC: NORMAL

## 2019-01-23 ENCOUNTER — TELEPHONE (OUTPATIENT)
Dept: GASTROENTEROLOGY | Facility: CLINIC | Age: 24
End: 2019-01-23

## 2019-01-23 NOTE — TELEPHONE ENCOUNTER
Letter to appeal denial for En[facility] on 1/4/19.     Called [facility] and spoke with Tori. Advised following up on the appeal for Entyvio sent on 1/4/19. She states the appeal was received 1/16/19 and is currently under review. Turnaround time is 30 days from the date it is received.     Called pt and advised of the above. She verb understanding and states she is having a hard time with the steroids. She is working her way down on the dose, she thinks she is at 35mg daily now, she cannot remember. She is very fatigued and nauseated with the meds. She is also still having pain on a daily basis. Asked why she is at 35mg daily still? Advised the script was sent to the pharmacy stating she should take 40mg daily until Mon 12/24, then decrease to 30mg daily for 1 week, then decrease by 10mg weekly until complete. Asked if she has not been decreasing by 10mg weekly? Pt states there was something wrong with instructions the day the med was sent to the pharmacy so she was not able to pick it up right away. Advised Jolynn spoke with the pharmacist, Jose, on 12/21 and clarified instructions for her. Pt states she knows, but she was not able to  the script until after Jt and by then, the pharmacist was not sure what to tell her regarding the dose. She states since she did not know how to dose it, she stopped taking the med and she has only taken it 2-3 times since her scope was done. She needs to know what to don because she has really back tracked since being off the med. Asked if she tried calling the office for help on dosing the med. Pt states no. She states she spoke to Dr Meléndez about the denial of the Entyvio but he did not mention the prednisone. Asked if she brought it up, since MD was unaware she was off the med at the time. Pt states no, she did not think about it. Advised will update MD and call back with recs. Pt verb understanding.

## 2019-01-23 NOTE — TELEPHONE ENCOUNTER
----- Message from Jason Skelton sent at 1/23/2019  2:10 PM EST -----  Regarding: pt called   Contact: [phone]  Pt is calling stated  told her to call in 2 weeks about a infusion he was going to set up

## 2019-01-23 NOTE — TELEPHONE ENCOUNTER
Spoke with MD. Updated regarding info obtained during call to [facility] and conversation with pt. He wants to try to get Humira approved though insurance if pt OK with giving herself an injection. He states pt should continue Prednisone 35mg for 1 week, then taper by 5mg weekly until taper is complete.     Called pt and advised left a message for call back.

## 2019-01-30 NOTE — TELEPHONE ENCOUNTER
Called pt... No answer after multiple rings; left a message for call back. Advised I have recommendations from MD and have been trying to reach her for several days. Advised will also reach out to one of her parents since they are listed on her release of information sheet.     Called pt's mother (ok per ROSE MARIE).... No answer after multiple rings; left a message that I am trying to reach her lance Deleon. Asked for call back.

## 2019-02-05 RX ORDER — PREDNISONE 1 MG/1
35 TABLET ORAL DAILY
Qty: 196 TABLET | Refills: 0 | Status: CANCELLED | OUTPATIENT
Start: 2019-02-05

## 2019-02-22 ENCOUNTER — TELEPHONE (OUTPATIENT)
Dept: GASTROENTEROLOGY | Facility: CLINIC | Age: 24
End: 2019-02-22

## 2019-02-22 NOTE — TELEPHONE ENCOUNTER
Called pt and appt made for 3/4 at 3:30PM. She asked if we have heard anything back on either the Entyvio or the [facility]? Advised will look into meds for her. Pt verb understanding.     Called [facility] and spoke with Lauri. He trasnferred me to the Appeals and [facility] Department and spoke with Preeti. Ref# for call [id number]. She was not able to help. She advised I needed to call [phone] ([facility]). Call was transferred. Spoke with Henry. He stated he was the [facility] and not able to help. He transferred me once again to the Appeals Department. I was on hold for 35 minutes, then hung up. I will try back again later if I have time.

## 2019-02-22 NOTE — TELEPHONE ENCOUNTER
----- Message from Luba Barraza sent at 2/22/2019  9:59 AM EST -----  Regarding: steroids  Contact: [phone]  Pt states that Dr Meléndez wants her in sooner because of her crohn's. Please call her regarding this. Appt scheduled 3/25/19. She was scheduled today but is sick.

## 2019-02-22 NOTE — TELEPHONE ENCOUNTER
Called pt back. Pt states she is sick with a GI bug that her room mates have had. She is throwing up and has diarrhea, but she was doing overall pretty well before this. She states she did not go to any of her classes yesterday and had to cancel her appt for today. She needs to come in and talk to Dr PASCUAL about her Crohn's and treatment plan but the next available appt was not until 3/25. Advised will see if MD can work her in sooner than that and call her back. Pt verb understanding.

## 2019-03-04 ENCOUNTER — TELEPHONE (OUTPATIENT)
Dept: GASTROENTEROLOGY | Facility: CLINIC | Age: 24
End: 2019-03-04

## 2019-03-04 ENCOUNTER — OFFICE VISIT (OUTPATIENT)
Dept: GASTROENTEROLOGY | Facility: CLINIC | Age: 24
End: 2019-03-04

## 2019-03-04 VITALS
HEIGHT: 68 IN | WEIGHT: 142.6 LBS | DIASTOLIC BLOOD PRESSURE: 68 MMHG | BODY MASS INDEX: 21.61 KG/M2 | SYSTOLIC BLOOD PRESSURE: 112 MMHG | TEMPERATURE: 97.7 F

## 2019-03-04 DIAGNOSIS — K50.814 CROHN'S DISEASE OF BOTH SMALL AND LARGE INTESTINE WITH ABSCESS (HCC): Primary | ICD-10-CM

## 2019-03-04 PROCEDURE — 99214 OFFICE O/P EST MOD 30 MIN: CPT | Performed by: INTERNAL MEDICINE

## 2019-03-04 RX ORDER — VORTIOXETINE 10 MG/1
TABLET, FILM COATED ORAL
COMMUNITY
Start: 2019-02-25 | End: 2021-09-02

## 2019-03-04 NOTE — PROGRESS NOTES
Chief Complaint   Patient presents with   • Follow-up   • Crohn's Disease     Subjective     HPI  Adrienne Rodrigues is a 23 y.o. female who presents today for f/u.  She has hx of ileal CD complicated by abscess requiring brief hospitalization late last year.  She has fortunately done relatively well since that time.  Follow up CT scan showed resolution of the fluid collection and overall improvement in the ileal inflammation.  We have been working with her insurance company on getting Adrienne approved for Entyvio, but unfortunately her PA was denied.  An appeal letter was sent but we have not heard back from this and it has been over 30 days.  Adrienne remains on prednisone, currently at 10mg/day.  She has intermittent pain in RLQ and fluctuation of bowel habit.  We discussed Humira as an alternative to Entyvio, and although reluctant to do self-injection she is agreeable to this.      Past Medical History:   Diagnosis Date   • Appendicitis     H/O SUBACUTE PERFORATED APPENDICITIS   • Asthma     RESCUE INHALER, HASN'T USED   • Crohn's disease (CMS/[facility])        Social History     Socioeconomic History   • Marital status: Single     Spouse name: Not on file   • Number of children: Not on file   • Years of education: Not on file   • Highest education level: Not on file   Tobacco Use   • Smoking status: Never Smoker   • Smokeless tobacco: Never Used   Substance and Sexual Activity   • Alcohol use: No   • Drug use: No   • Sexual activity: Defer         Current Outpatient Medications:   •  lisdexamfetamine (VYVANSE) 40 MG capsule, Take 40 mg by mouth Every Morning, Disp: , Rfl:   •  LO LOESTRIN FE 1 MG-10 MCG / 10 MCG tablet, Take 1 tablet by mouth daily., Disp: , Rfl:   •  ondansetron (ZOFRAN) 4 MG tablet, Take 1 tablet by mouth Every 6 (Six) Hours As Needed for Nausea or Vomiting., Disp: 10 tablet, Rfl: 3  •  predniSONE (DELTASONE) 10 MG tablet, Continue taking 4 PO QAM until Monday 2/24 then decrease to 3 PO QAM. Take 3  Evidence by leukocytosis and lactic acidosis due to olecranon bursitis of right elbow     Lactic 2 5 -> 2 1, s/p 1 L NS bolus  · OK to discontinue gentle IV hydration  · Continue IV Rocephin and vancomycin  · Follow up wound and blood cultures  · Monitor for worsening signs of sepsis  · Holding HCTZ PO QAM x 1 week, then taper by 10mg weekly until taper complete, Disp: 126 tablet, Rfl: 0  •  predniSONE (DELTASONE) 20 MG tablet, Take 2 tablets by mouth Daily., Disp: 30 tablet, Rfl: 1  •  TRINTELLIX 10 MG tablet, , Disp: , Rfl:   •  dicyclomine (BENTYL) 10 MG capsule, Take 1 capsule by mouth 3 (Three) Times a Day As Needed (abd pain and or diarrhea)., Disp: 90 capsule, Rfl: 1  •  propranolol (INDERAL) 10 MG tablet, Take 10 mg by mouth Daily., Disp: , Rfl:     Review of Systems   Constitutional: Negative.    Gastrointestinal: Positive for abdominal pain and nausea.       Objective   Vitals:    03/04/19 1537   BP: 112/68   Temp: 97.7 °F (36.5 °C)       Physical Exam   Constitutional: She is oriented to person, place, and time. She appears well-developed and well-nourished.   HENT:   Head: Normocephalic and atraumatic.   Abdominal: Soft. Bowel sounds are normal. She exhibits no distension and no mass. There is no tenderness. No hernia.   Neurological: She is alert and oriented to person, place, and time.   Skin: Skin is warm and dry.   Psychiatric: She has a normal mood and affect. Her behavior is normal. Judgment and thought content normal.   Vitals reviewed.      Assessment/Plan   Assessment:     1. Crohn's disease of both small and large intestine with abscess (CMS/HCC)      Plan:   Entyvio PA denied and still no response from insurance company regarding appeal letter.  At this point I think we need to move forward with alternative therapy, and would recommend Humira.  I discussed risks/benefits of anti-TNF therapy in detail today with Adrienne, and she is agreeable.  My staff is already working on PA.  In the interim, I would like her to increase prednisone to 20mg/day and hold at this dose until she receives her Humira started dose.  We will check routine labs today in office.          Vaibhav Meléndez M.D.  [facility]  [address]  [place] [address]  Office: [phone]

## 2019-03-04 NOTE — TELEPHONE ENCOUNTER
----- Message from Vaibhav Meléndez MD sent at 3/4/2019  4:13 PM EST -----  Pt needs PA for Humira please - insurance has denied Entyvio

## 2019-03-05 ENCOUNTER — PRIOR AUTHORIZATION (OUTPATIENT)
Dept: GASTROENTEROLOGY | Facility: CLINIC | Age: 24
End: 2019-03-05

## 2019-03-05 LAB
25(OH)D3+25(OH)D2 SERPL-MCNC: 15.5 NG/ML (ref 30–100)
ALBUMIN SERPL-MCNC: 4.4 G/DL (ref 3.5–5.2)
ALBUMIN/GLOB SERPL: 1.5 G/DL
ALP SERPL-CCNC: 63 U/L (ref 39–117)
ALT SERPL-CCNC: 17 U/L (ref 1–33)
AST SERPL-CCNC: 48 U/L (ref 1–32)
BASOPHILS # BLD AUTO: 0.04 10*3/MM3 (ref 0–0.2)
BASOPHILS NFR BLD AUTO: 0.5 % (ref 0–1.5)
BILIRUB SERPL-MCNC: 0.2 MG/DL (ref 0.1–1.2)
BUN SERPL-MCNC: 11 MG/DL (ref 6–20)
BUN/CREAT SERPL: 15.5 (ref 7–25)
CALCIUM SERPL-MCNC: 9.8 MG/DL (ref 8.6–10.5)
CHLORIDE SERPL-SCNC: 101 MMOL/L (ref 98–107)
CO2 SERPL-SCNC: 29.8 MMOL/L (ref 22–29)
CREAT SERPL-MCNC: 0.71 MG/DL (ref 0.57–1)
EOSINOPHIL # BLD AUTO: 0.08 10*3/MM3 (ref 0–0.4)
EOSINOPHIL NFR BLD AUTO: 0.9 % (ref 0.3–6.2)
ERYTHROCYTE [DISTWIDTH] IN BLOOD BY AUTOMATED COUNT: 13.3 % (ref 12.3–15.4)
GLOBULIN SER CALC-MCNC: 2.9 GM/DL
GLUCOSE SERPL-MCNC: 65 MG/DL (ref 65–99)
HCT VFR BLD AUTO: 43.1 % (ref 34–46.6)
HGB BLD-MCNC: 13.4 G/DL (ref 12–15.9)
IMM GRANULOCYTES # BLD AUTO: 0.04 10*3/MM3 (ref 0–0.05)
IMM GRANULOCYTES NFR BLD AUTO: 0.5 % (ref 0–0.5)
LYMPHOCYTES # BLD AUTO: 1.62 10*3/MM3 (ref 0.7–3.1)
LYMPHOCYTES NFR BLD AUTO: 18.7 % (ref 19.6–45.3)
MCH RBC QN AUTO: 27.7 PG (ref 26.6–33)
MCHC RBC AUTO-ENTMCNC: 31.1 G/DL (ref 31.5–35.7)
MCV RBC AUTO: 89.2 FL (ref 79–97)
MONOCYTES # BLD AUTO: 0.41 10*3/MM3 (ref 0.1–0.9)
MONOCYTES NFR BLD AUTO: 4.7 % (ref 5–12)
NEUTROPHILS # BLD AUTO: 6.46 10*3/MM3 (ref 1.4–7)
NEUTROPHILS NFR BLD AUTO: 74.7 % (ref 42.7–76)
NRBC BLD AUTO-RTO: 0 /100 WBC (ref 0–0)
PLATELET # BLD AUTO: 290 10*3/MM3 (ref 140–450)
POTASSIUM SERPL-SCNC: 4.5 MMOL/L (ref 3.5–5.2)
PROT SERPL-MCNC: 7.3 G/DL (ref 6–8.5)
RBC # BLD AUTO: 4.83 10*6/MM3 (ref 3.77–5.28)
SODIUM SERPL-SCNC: 140 MMOL/L (ref 136–145)
WBC # BLD AUTO: 8.65 10*3/MM3 (ref 3.4–10.8)

## 2019-03-08 NOTE — TELEPHONE ENCOUNTER
Humira approved through 3/7/21 - l/m for patient gregorio Neil to call her when the kit is delivered so she can make arrangements to come in and be taught how to use it.

## 2019-03-15 NOTE — TELEPHONE ENCOUNTER
3/14/19 Update from [facility] stating Pt unsure if she is going to take Humira and wants to discuss side effects with parents first. They will keep us updated.

## 2019-04-15 ENCOUNTER — TELEPHONE (OUTPATIENT)
Dept: GASTROENTEROLOGY | Facility: CLINIC | Age: 24
End: 2019-04-15

## 2019-04-15 NOTE — TELEPHONE ENCOUNTER
Called [facility] and left a message that pt and pt's father would like her Humira sent to [facility] as med is cheaper there. Asked that they transfer the med and then call pt's father to let him know this has been done.

## 2019-04-15 NOTE — TELEPHONE ENCOUNTER
Called pt's father back. He states the RN who came in to show Adrienne how to use the Humira injection told them that it would be cheaper to send the script to [facility] as opposed to [facility]. She used the first two injections this past Friday. Her next injection will be due not this Friday, but next. He wanted to know if we could send her script to [facility] so they can ship her next script. Advised will call [facility] and talk to them about sending her script to [facility]. Pt's dad verb understanding.

## 2019-04-15 NOTE — TELEPHONE ENCOUNTER
----- Message from Jason Skelton sent at 4/15/2019  3:31 PM EDT -----  Regarding: pt gail paloma called   Contact: [phone]  Pt dad has some questions & is asking for a call back.

## 2019-04-18 NOTE — TELEPHONE ENCOUNTER
Spoke with Clara at [facility] who stated the Rx for Humira was transferred to [facility] on 4/16/19. They reached out to pt and l/m for call back.     Clara is calling Pt's father Henrry to discuss now.

## 2019-05-13 PROCEDURE — 99283 EMERGENCY DEPT VISIT LOW MDM: CPT

## 2019-05-14 ENCOUNTER — HOSPITAL ENCOUNTER (EMERGENCY)
Facility: HOSPITAL | Age: 24
Discharge: HOME | End: 2019-05-14
Attending: EMERGENCY MEDICINE | Admitting: EMERGENCY MEDICINE

## 2019-05-14 ENCOUNTER — APPOINTMENT (OUTPATIENT)
Dept: CT IMAGING | Facility: HOSPITAL | Age: 24
End: 2019-05-14

## 2019-05-14 VITALS
OXYGEN SATURATION: 99 % | HEIGHT: 68 IN | SYSTOLIC BLOOD PRESSURE: 97 MMHG | RESPIRATION RATE: 16 BRPM | WEIGHT: 137.1 LBS | HEART RATE: 88 BPM | DIASTOLIC BLOOD PRESSURE: 68 MMHG | BODY MASS INDEX: 20.78 KG/M2 | TEMPERATURE: 98.2 F

## 2019-05-14 DIAGNOSIS — R10.30 LOWER ABDOMINAL PAIN: ICD-10-CM

## 2019-05-14 DIAGNOSIS — R10.31 RIGHT LOWER QUADRANT ABDOMINAL PAIN: Primary | ICD-10-CM

## 2019-05-14 DIAGNOSIS — K50.90 CROHN'S DISEASE WITHOUT COMPLICATION, UNSPECIFIED GASTROINTESTINAL TRACT LOCATION (HCC): ICD-10-CM

## 2019-05-14 LAB
ALBUMIN SERPL-MCNC: 4.6 G/DL (ref 3.5–5.2)
ALBUMIN/GLOB SERPL: 2.2 G/DL
ALP SERPL-CCNC: 45 U/L (ref 39–117)
ALT SERPL W P-5'-P-CCNC: 11 U/L (ref 1–33)
ANION GAP SERPL CALCULATED.3IONS-SCNC: 8.6 MMOL/L
AST SERPL-CCNC: 15 U/L (ref 1–32)
B-HCG UR QL: NEGATIVE
BACTERIA UR QL AUTO: ABNORMAL /HPF
BASOPHILS # BLD AUTO: 0.02 10*3/MM3 (ref 0–0.2)
BASOPHILS NFR BLD AUTO: 0.3 % (ref 0–1.5)
BILIRUB SERPL-MCNC: 0.4 MG/DL (ref 0.2–1.2)
BILIRUB UR QL STRIP: NEGATIVE
BUN BLD-MCNC: 9 MG/DL (ref 6–20)
BUN/CREAT SERPL: 14.8 (ref 7–25)
CALCIUM SPEC-SCNC: 8.9 MG/DL (ref 8.6–10.5)
CHLORIDE SERPL-SCNC: 103 MMOL/L (ref 98–107)
CLARITY UR: CLEAR
CO2 SERPL-SCNC: 25.4 MMOL/L (ref 22–29)
COLOR UR: YELLOW
CREAT BLD-MCNC: 0.61 MG/DL (ref 0.57–1)
DEPRECATED RDW RBC AUTO: 42.5 FL (ref 37–54)
EOSINOPHIL # BLD AUTO: 0.09 10*3/MM3 (ref 0–0.4)
EOSINOPHIL NFR BLD AUTO: 1.4 % (ref 0.3–6.2)
ERYTHROCYTE [DISTWIDTH] IN BLOOD BY AUTOMATED COUNT: 13.2 % (ref 12.3–15.4)
GFR SERPL CREATININE-BSD FRML MDRD: 124 ML/MIN/1.73
GLOBULIN UR ELPH-MCNC: 2.1 GM/DL
GLUCOSE BLD-MCNC: 86 MG/DL (ref 65–99)
GLUCOSE UR STRIP-MCNC: NEGATIVE MG/DL
HCT VFR BLD AUTO: 40.7 % (ref 34–46.6)
HGB BLD-MCNC: 13.4 G/DL (ref 12–15.9)
HGB UR QL STRIP.AUTO: NEGATIVE
HYALINE CASTS UR QL AUTO: ABNORMAL /LPF
IMM GRANULOCYTES # BLD AUTO: 0.01 10*3/MM3 (ref 0–0.05)
IMM GRANULOCYTES NFR BLD AUTO: 0.2 % (ref 0–0.5)
KETONES UR QL STRIP: NEGATIVE
LEUKOCYTE ESTERASE UR QL STRIP.AUTO: ABNORMAL
LIPASE SERPL-CCNC: 51 U/L (ref 13–60)
LYMPHOCYTES # BLD AUTO: 2.86 10*3/MM3 (ref 0.7–3.1)
LYMPHOCYTES NFR BLD AUTO: 45.8 % (ref 19.6–45.3)
MCH RBC QN AUTO: 28.9 PG (ref 26.6–33)
MCHC RBC AUTO-ENTMCNC: 32.9 G/DL (ref 31.5–35.7)
MCV RBC AUTO: 87.9 FL (ref 79–97)
MONOCYTES # BLD AUTO: 0.46 10*3/MM3 (ref 0.1–0.9)
MONOCYTES NFR BLD AUTO: 7.4 % (ref 5–12)
NEUTROPHILS # BLD AUTO: 2.8 10*3/MM3 (ref 1.7–7)
NEUTROPHILS NFR BLD AUTO: 44.9 % (ref 42.7–76)
NITRITE UR QL STRIP: NEGATIVE
NRBC BLD AUTO-RTO: 0.2 /100 WBC (ref 0–0.2)
PH UR STRIP.AUTO: 5.5 [PH] (ref 5–8)
PLATELET # BLD AUTO: 208 10*3/MM3 (ref 140–450)
PMV BLD AUTO: 9.9 FL (ref 6–12)
POTASSIUM BLD-SCNC: 3.7 MMOL/L (ref 3.5–5.2)
PROT SERPL-MCNC: 6.7 G/DL (ref 6–8.5)
PROT UR QL STRIP: NEGATIVE
RBC # BLD AUTO: 4.63 10*6/MM3 (ref 3.77–5.28)
RBC # UR: ABNORMAL /HPF
REF LAB TEST METHOD: ABNORMAL
SODIUM BLD-SCNC: 137 MMOL/L (ref 136–145)
SP GR UR STRIP: 1.01 (ref 1–1.03)
SQUAMOUS #/AREA URNS HPF: ABNORMAL /HPF
UROBILINOGEN UR QL STRIP: ABNORMAL
WBC NRBC COR # BLD: 6.24 10*3/MM3 (ref 3.4–10.8)
WBC UR QL AUTO: ABNORMAL /HPF

## 2019-05-14 PROCEDURE — 85025 COMPLETE CBC W/AUTO DIFF WBC: CPT | Performed by: PHYSICIAN ASSISTANT

## 2019-05-14 PROCEDURE — 81001 URINALYSIS AUTO W/SCOPE: CPT | Performed by: PHYSICIAN ASSISTANT

## 2019-05-14 PROCEDURE — 81025 URINE PREGNANCY TEST: CPT | Performed by: PHYSICIAN ASSISTANT

## 2019-05-14 PROCEDURE — 96374 THER/PROPH/DIAG INJ IV PUSH: CPT

## 2019-05-14 PROCEDURE — 96376 TX/PRO/DX INJ SAME DRUG ADON: CPT

## 2019-05-14 PROCEDURE — 96375 TX/PRO/DX INJ NEW DRUG ADDON: CPT

## 2019-05-14 PROCEDURE — 25010000002 HYDROMORPHONE PER 4 MG: Performed by: EMERGENCY MEDICINE

## 2019-05-14 PROCEDURE — 25010000002 ONDANSETRON PER 1 MG: Performed by: PHYSICIAN ASSISTANT

## 2019-05-14 PROCEDURE — 80053 COMPREHEN METABOLIC PANEL: CPT | Performed by: PHYSICIAN ASSISTANT

## 2019-05-14 PROCEDURE — 74177 CT ABD & PELVIS W/CONTRAST: CPT

## 2019-05-14 PROCEDURE — 83690 ASSAY OF LIPASE: CPT | Performed by: PHYSICIAN ASSISTANT

## 2019-05-14 PROCEDURE — 25010000002 IOPAMIDOL 61 % SOLUTION: Performed by: EMERGENCY MEDICINE

## 2019-05-14 RX ORDER — ONDANSETRON 4 MG/1
4 TABLET, FILM COATED ORAL EVERY 6 HOURS PRN
Qty: 12 TABLET | Refills: 0 | Status: SHIPPED | OUTPATIENT
Start: 2019-05-14 | End: 2021-09-02

## 2019-05-14 RX ORDER — SODIUM CHLORIDE 0.9 % (FLUSH) 0.9 %
10 SYRINGE (ML) INJECTION AS NEEDED
Status: DISCONTINUED | OUTPATIENT
Start: 2019-05-14 | End: 2019-05-14 | Stop reason: HOSPADM

## 2019-05-14 RX ORDER — DICYCLOMINE HYDROCHLORIDE 10 MG/1
10 CAPSULE ORAL 3 TIMES DAILY
Qty: 30 CAPSULE | Refills: 0 | Status: SHIPPED | OUTPATIENT
Start: 2019-05-14 | End: 2022-05-10

## 2019-05-14 RX ORDER — HYDROMORPHONE HYDROCHLORIDE 1 MG/ML
0.5 INJECTION, SOLUTION INTRAMUSCULAR; INTRAVENOUS; SUBCUTANEOUS ONCE
Status: COMPLETED | OUTPATIENT
Start: 2019-05-14 | End: 2019-05-14

## 2019-05-14 RX ORDER — ONDANSETRON 2 MG/ML
4 INJECTION INTRAMUSCULAR; INTRAVENOUS ONCE
Status: COMPLETED | OUTPATIENT
Start: 2019-05-14 | End: 2019-05-14

## 2019-05-14 RX ADMIN — ONDANSETRON HYDROCHLORIDE 4 MG: 2 SOLUTION INTRAMUSCULAR; INTRAVENOUS at 01:18

## 2019-05-14 RX ADMIN — HYDROMORPHONE HYDROCHLORIDE 0.5 MG: 1 INJECTION, SOLUTION INTRAMUSCULAR; INTRAVENOUS; SUBCUTANEOUS at 01:19

## 2019-05-14 RX ADMIN — HYDROMORPHONE HYDROCHLORIDE 0.5 MG: 1 INJECTION, SOLUTION INTRAMUSCULAR; INTRAVENOUS; SUBCUTANEOUS at 04:30

## 2019-05-14 RX ADMIN — IOPAMIDOL 85 ML: 612 INJECTION, SOLUTION INTRAVENOUS at 03:34

## 2019-05-14 RX ADMIN — SODIUM CHLORIDE 1000 ML: 9 INJECTION, SOLUTION INTRAVENOUS at 01:17

## 2019-05-14 RX ADMIN — ONDANSETRON HYDROCHLORIDE 4 MG: 2 SOLUTION INTRAMUSCULAR; INTRAVENOUS at 04:30

## 2019-05-14 NOTE — ED NOTES
"Pt c/o generalized abd pain, worse RLQ abdomen that became worse over the past couple hours. Hx of crohn's. +nausea and reports \"bowels have been all over the place.\"     Britney Arnold RN  05/14/19 0000    "

## 2019-05-14 NOTE — ED PROVIDER NOTES
The JAYMIE and I have discussed this patient's history, physical exam, and treatment plan. I have reviewed the documentation and personally had a face to face interaction with the patient  I affirm the documentation and agree with the treatment and plan.  The following describes my personal findings.    HPI  The patient presents complaining of stabbing abd pain that began around 2200. Pt also complains of intermittent N/V/D for a few days. Pt states she has hx of Chron's disease and is followed by  (GI). She states this feels like a regular Chron's flare up. Pt states she had ruptured appendicitis in the past and abscesses. Pt states have improved with pain medication. Family at bedside.     Limited physical exam:  Patient is nontoxic appearing   Abdomen: mild tenderness RLQ, no rebound or guarding    0205-Informed pt and family of lab work that is unremarkable. Discussed risk and benefits of CT abd/pelvis with pt and family. Informed pt and family that if there was a large abscess that WBC should be elevated. Discussed the plan to reevaluate pt and let them discuss CT. The patient indicates understanding of these issues and agrees with the plan.      Documentation assistance provided by kristi Garcia for [facility].  Information recorded by the scribe was done at my direction and has been verified and validated by me.         Lulu Garcia  05/14/19 0208       Dong Parikh MD  05/14/19 2539

## 2019-05-14 NOTE — DISCHARGE INSTRUCTIONS
Home, rest, home medicine as prescribed, keep well hydrated, follow up with your GI doctor and PCP for recheck. Return to care with further concerns.

## 2019-05-14 NOTE — ED PROVIDER NOTES
" EMERGENCY DEPARTMENT ENCOUNTER    CHIEF COMPLAINT  Chief Complaint: periumbilical abdominal pain  History given by: pt  History limited by: none  Room Number: 26/26  PMD: Emmie Evangelista MD  GI: Dr Meléndez    HPI:  Pt is a 23 y.o. female with a history of Chron's disease who presents complaining of \"sharp\" pain to her periumbilical area that started an hour ago and is similar to her regular Chron's flare ups. Pt admits to intermittent diarrhea and dull abdominal pain to the rest of the abdomen but denies fever or chills. Pt mom reports pt was hospitalized for similar pain last year. Pt reports she started Humira about two months ago.     Duration:  An hour  Onset: gradual  Timing: constant  Location: periumbilical  Radiation: none  Quality: \"sharp\"  Intensity/Severity: moderate  Progression: unchanged  Associated Symptoms: intermittent diarrhea and dull abdominal pain to the rest of the abdomen  Aggravating Factors: none  Alleviating Factors: none  Previous Episodes: Pt has a history of Chron's and symptoms are similar to regular flare ups.  Treatment before arrival: none    PAST MEDICAL HISTORY  Active Ambulatory Problems     Diagnosis Date Noted   • Mallet finger of left hand 03/17/2016   • Knee pain, acute 03/17/2016   • Ruptured appendicitis 05/17/2016   • Exacerbation of Crohn's disease with abscess (CMS/HCC) 11/01/2018   • Mild intermittent asthma without complication 11/01/2018   • Crohn's disease of small intestine with abscess (CMS/HCC) 12/17/2018     Resolved Ambulatory Problems     Diagnosis Date Noted   • No Resolved Ambulatory Problems     Past Medical History:   Diagnosis Date   • Appendicitis    • Asthma    • Crohn's disease (CMS/HCC)        PAST SURGICAL HISTORY  Past Surgical History:   Procedure Laterality Date   • APPENDECTOMY N/A 6/14/2016    Procedure: APPENDECTOMY LAPAROSCOPIC;  Surgeon: Donnie Carlson MD;  Location: [facility];  Service:    • COLONOSCOPY      age 10   • COLONOSCOPY " N/A 12/21/2018    Congested mucosa with erosions/ulceration in the terminal ileum and at the ileoceal valve. Path: Extensive acute inflammation, reactive changes of the surface epithelium, blunting and atrophy of the villi.    • TONSILLECTOMY         FAMILY HISTORY  Family History   Problem Relation Age of Onset   • Cancer Maternal Grandfather    • COPD Maternal Grandfather    • Miscarriages / Stillbirths Mother    • Crohn's disease Mother        SOCIAL HISTORY  Social History     Socioeconomic History   • Marital status: Single     Spouse name: Not on file   • Number of children: Not on file   • Years of education: Not on file   • Highest education level: Not on file   Tobacco Use   • Smoking status: Never Smoker   • Smokeless tobacco: Never Used   Substance and Sexual Activity   • Alcohol use: No   • Drug use: No   • Sexual activity: Defer       ALLERGIES  Patient has no known allergies.    REVIEW OF SYSTEMS  Review of Systems   Constitutional: Negative for fever.   HENT: Negative for sore throat.    Eyes: Negative.    Respiratory: Negative for cough and shortness of breath.    Cardiovascular: Negative for chest pain.   Gastrointestinal: Positive for abdominal pain (sharp; periumbilical, dull diffusely) and diarrhea (intermittent). Negative for vomiting.   Genitourinary: Negative for difficulty urinating, dysuria and hematuria.   Musculoskeletal: Negative for neck pain.   Skin: Negative for rash.   Neurological: Negative for weakness, numbness and headaches.   Hematological: Negative.    Psychiatric/Behavioral: Negative.    All other systems reviewed and are negative.      PHYSICAL EXAM  ED Triage Vitals   Temp Heart Rate Resp BP SpO2   05/14/19 0000 05/14/19 0000 05/14/19 0000 05/14/19 0013 05/14/19 0000   98.1 °F (36.7 °C) 84 17 106/76 99 %      Temp src Heart Rate Source Patient Position BP Location FiO2 (%)   05/14/19 0000 -- 05/14/19 0013 05/14/19 0013 --   Tympanic  Sitting Right arm        Physical Exam    Constitutional: She is oriented to person, place, and time. No distress.   HENT:   Head: Normocephalic and atraumatic.   Eyes: EOM are normal. Pupils are equal, round, and reactive to light.   Neck: Normal range of motion. Neck supple.   Cardiovascular: Normal rate, regular rhythm and normal heart sounds.   Pulmonary/Chest: Effort normal and breath sounds normal. No respiratory distress.   Abdominal: Soft. There is tenderness (lower). There is guarding. There is no rebound.   Musculoskeletal: Normal range of motion. She exhibits no edema.   Neurological: She is alert and oriented to person, place, and time. She has normal sensation and normal strength.   Skin: Skin is warm and dry. No rash noted.   Psychiatric: Mood and affect normal.   Nursing note and vitals reviewed.      LAB RESULTS  Lab Results (last 24 hours)     Procedure Component Value Units Date/Time    CBC & Differential [[id number]] Collected:  05/14/19 0109    Specimen:  Blood Updated:  05/14/19 0119    Narrative:       The following orders were created for panel order CBC & Differential.  Procedure                               Abnormality         Status                     ---------                               -----------         ------                     CBC Auto Differential[[id number]]        Abnormal            Final result                 Please view results for these tests on the individual orders.    Comprehensive Metabolic Panel [[id number]] Collected:  05/14/19 0109    Specimen:  Blood Updated:  05/14/19 0146     Glucose 86 mg/dL      BUN 9 mg/dL      Creatinine 0.61 mg/dL      Sodium 137 mmol/L      Potassium 3.7 mmol/L      Chloride 103 mmol/L      CO2 25.4 mmol/L      Calcium 8.9 mg/dL      Total Protein 6.7 g/dL      Albumin 4.60 g/dL      ALT (SGPT) 11 U/L      AST (SGOT) 15 U/L      Alkaline Phosphatase 45 U/L      Total Bilirubin 0.4 mg/dL      eGFR Non African Amer 124 mL/min/1.73      Globulin 2.1 gm/dL      A/G Ratio 2.2 g/dL       BUN/Creatinine Ratio 14.8     Anion Gap 8.6 mmol/L     Narrative:       GFR Normal >60  Chronic Kidney Disease <60  Kidney Failure <15    Lipase [[id number]]  (Normal) Collected:  05/14/19 0109    Specimen:  Blood Updated:  05/14/19 0146     Lipase 51 U/L     CBC Auto Differential [[id number]]  (Abnormal) Collected:  05/14/19 0109    Specimen:  Blood Updated:  05/14/19 0119     WBC 6.24 10*3/mm3      RBC 4.63 10*6/mm3      Hemoglobin 13.4 g/dL      Hematocrit 40.7 %      MCV 87.9 fL      MCH 28.9 pg      MCHC 32.9 g/dL      RDW 13.2 %      RDW-SD 42.5 fl      MPV 9.9 fL      Platelets 208 10*3/mm3      Neutrophil % 44.9 %      Lymphocyte % 45.8 %      Monocyte % 7.4 %      Eosinophil % 1.4 %      Basophil % 0.3 %      Immature Grans % 0.2 %      Neutrophils, Absolute 2.80 10*3/mm3      Lymphocytes, Absolute 2.86 10*3/mm3      Monocytes, Absolute 0.46 10*3/mm3      Eosinophils, Absolute 0.09 10*3/mm3      Basophils, Absolute 0.02 10*3/mm3      Immature Grans, Absolute 0.01 10*3/mm3      nRBC 0.2 /100 WBC     Pregnancy, Urine - Urine, Clean Catch [[id number]]  (Normal) Collected:  05/14/19 0111    Specimen:  Urine, Clean Catch Updated:  05/14/19 0120     HCG, Urine QL Negative    Urinalysis With Microscopic If Indicated (No Culture) - Urine, Clean Catch [[id number]]  (Abnormal) Collected:  05/14/19 0111    Specimen:  Urine, Clean Catch Updated:  05/14/19 0122     Color, UA Yellow     Appearance, UA Clear     pH, UA 5.5     Specific Gravity, UA 1.011     Glucose, UA Negative     Ketones, UA Negative     Bilirubin, UA Negative     Blood, UA Negative     Protein, UA Negative     Leuk Esterase, UA Moderate (2+)     Nitrite, UA Negative     Urobilinogen, UA 0.2 E.U./dL    Urinalysis, Microscopic Only - Urine, Clean Catch [[id number]]  (Abnormal) Collected:  05/14/19 0111    Specimen:  Urine, Clean Catch Updated:  05/14/19 0122     RBC, UA 0-2 /HPF      WBC, UA 6-12 /HPF      Bacteria, UA None Seen /HPF      Squamous Epithelial  Cells, UA 3-6 /HPF      Hyaline Casts, UA 0-2 /LPF      Methodology Automated Microscopy          I ordered the above labs and reviewed the results    RADIOLOGY  CT Abdomen Pelvis With Contrast   Final Result   IMPRESSION :    1. Continued improvement involving the distal/terminal ileitis without   any significant mesenteric inflammation or evidence to indicate   obstruction.   2. Diminishing pelvic ascites.   3. Stable 3.6 cm left ovarian cyst           This report was finalized on 5/14/2019 4:05 AM by Pb Majano M.D.               I ordered the above noted radiological studies. Interpreted by radiologist. Reviewed by me in PACS.       PROCEDURES  Procedures      PROGRESS AND CONSULTS       0157  Discussed pt case with Dr Parikh who will see the pt.    0238  Rechecked patient who is resting. Discussed all lab results. Discussed option to do a CT scan. Pt denies Discussed plan to talk to GI and try to get the pt in to see GI ASAP. Pt understands and agrees with the plan. All questions have been answered.    0250  Ordered GI consult.    0258  Discussed case with Dr Aguilar, GI  Reviewed history, exam, results and treatments.  Discussed concerns and plan of care.  Dr Aguilar recommends doing the abd/pel CT scan.     0306  Rechecked patient who is resting. Discussed conversation with Dr Aguilar Discussed plan to do a CT scan. Pt understands and agrees with the plan. All questions have been answered.    0455  Rechecked patient who is resting comfortably. Discussed all lab and test results. Discussed plan to discharge the pt and have the pt follow up with GI. Pt understands and agrees with the plan. All questions have been answered.        MEDICAL DECISION MAKING  Results were reviewed/discussed with the patient and they were also made aware of online access. Pt also made aware that some labs, such as cultures, will not be resulted during ER visit and follow up with PMD is necessary.     MDM  Number of  Diagnoses or Management Options     Amount and/or Complexity of Data Reviewed  Clinical lab tests: ordered and reviewed (Normal)  Tests in the radiology section of CPT®: ordered and reviewed (Abd/pel CT- 1. Continued improvement involving the distal/terminal ileitis without any significant mesenteric inflammation or evidence to indicate obstruction.)  Discuss the patient with other providers: yes (Dr Aguilar)           DIAGNOSIS  Final diagnoses:   Right lower quadrant abdominal pain   Crohn's disease without complication, unspecified gastrointestinal tract location (CMS/HCC)       DISPOSITION  DISCHARGE    Patient discharged in stable condition.    Reviewed implications of results, diagnosis, meds, responsibility to follow up, warning signs and symptoms of possible worsening, potential complications and reasons to return to ER.    Patient/Family voiced understanding of above instructions.    Discussed plan for discharge, as there is no emergent indication for admission. Patient referred to primary care provider for BP management due to today's BP. Pt/family is agreeable and understands need for follow up and repeat testing.  Pt is aware that discharge does not mean that nothing is wrong but it indicates no emergency is present that requires admission and they must continue care with follow-up as given below or physician of their choice.     FOLLOW-UP  Vaibhav Meléndez MD  [address]  [place] [id number] [phone]    Schedule an appointment as soon as possible for a visit       Emmie Evangelista MD  [address]  [place] [address]  [phone]    Schedule an appointment as soon as possible for a visit            Medication List      Changed    dicyclomine 10 MG capsule  Commonly known as:  BENTYL  Take 1 capsule by mouth 3 (Three) Times a Day.  What changed:    when to take this  reasons to take this        Stop    predniSONE 10 MG tablet  Commonly known as:   DELTASONE     predniSONE 20 MG tablet  Commonly known as:  DELTASONE     propranolol 10 MG tablet  Commonly known as:  INDERAL              Latest Documented Vital Signs:  As of 5:38 AM  BP- 97/68 HR- 88 Temp- 98.2 °F (36.8 °C) (Oral) O2 sat- 99%    --  Documentation assistance provided by kristi Gee for Henry Yañez.  Information recorded by the scribe was done at my direction and has been verified and validated by me.       Alissa Gee  05/14/19 0504       Henry Yañez PA  05/14/19 0538

## 2019-05-14 NOTE — ED NOTES
Pt here for c/o abdominal pain - pt has crohn's, just started humira last month.  Pt states this is like her previous flares, but the pain is much worse.  Pt states she has intermittent diarrhea.  Bed in low position, call light within reach, side rails up X2. Pt is alert and oriented X4, PERRLA, respirations are even and unlabored, chest rise and fall is equal in expansion.  Pt does not appear to be in distress at this time     Shereen Urbano, RN  05/14/19 0018

## 2019-05-21 ENCOUNTER — OFFICE VISIT (OUTPATIENT)
Dept: GASTROENTEROLOGY | Facility: CLINIC | Age: 24
End: 2019-05-21

## 2019-05-21 VITALS
HEIGHT: 68 IN | SYSTOLIC BLOOD PRESSURE: 104 MMHG | DIASTOLIC BLOOD PRESSURE: 66 MMHG | BODY MASS INDEX: 20.76 KG/M2 | WEIGHT: 137 LBS | TEMPERATURE: 97.9 F

## 2019-05-21 DIAGNOSIS — R10.33 PERIUMBILICAL ABDOMINAL PAIN: ICD-10-CM

## 2019-05-21 DIAGNOSIS — R11.0 NAUSEA: ICD-10-CM

## 2019-05-21 DIAGNOSIS — K50.814 CROHN'S DISEASE OF BOTH SMALL AND LARGE INTESTINE WITH ABSCESS (HCC): Primary | ICD-10-CM

## 2019-05-21 PROCEDURE — 99214 OFFICE O/P EST MOD 30 MIN: CPT | Performed by: NURSE PRACTITIONER

## 2019-05-21 RX ORDER — HYOSCYAMINE SULFATE 0.125 MG
0.12 TABLET ORAL
Qty: 120 TABLET | Refills: 5 | Status: SHIPPED | OUTPATIENT
Start: 2019-05-21 | End: 2021-09-02

## 2019-05-21 RX ORDER — ADALIMUMAB 40MG/0.4ML
1 KIT SUBCUTANEOUS
COMMUNITY
Start: 2019-05-13 | End: 2019-10-19 | Stop reason: SDUPTHER

## 2019-05-21 NOTE — PROGRESS NOTES
Chief Complaint   Patient presents with   • Follow-up     hospital follow up   • Crohn's Disease     HPI    Adrienne Rodrigues is a  23 y.o. female here for a follow up visit for Crohn's disease follow-up ER visit.  This is an established patient of Dr. Meléndez's, new to me.  She has a history of ileal Crohn's disease 9 diagnosed at age 10) complicated by abscess requiring brief hospitalization last year.  We had been working on approval for Entyvio but unfortunately PA was denied.  Patient was started on Humira.  She was seen in the emergency room on 5/14/2019 for periumbilical pain.    Labs with normal kidney function and normal liver function.  Lipase of 51.  No evidence of anemia or leukocytosis.  Urinalysis was negative for bacteria.    CT of abdomen pelvis with contrast with improvement of the distal and terminal ileitis with out significant mesenteric inflammation or evidence of obstruction.  Diminishing pelvic ascites.  Stable 3.6 cm left ovarian cyst.  Patient was discharged on Bentyl, she try this medication but it made her too sleepy so she stopped.    On visit today the patient's primary complaint is periumbilical pain that comes and goes.  The pain is described as a deep ache/pinching sensation.  No identifiable triggers.  Her bowels are moving daily with varying consistency but mostly formed.  No fever or rectal bleeding.  Patient has been on Humira every 2 weeks since around 4/15/2019.  She has had 5 shots total.    Her appetite is on the poor side.  There is occasional nausea but no vomiting.  Her weight is stable.  She is been fluctuating between 131lbs and 142 lbs.  Rare heartburn.  No dysphagia.    Past Medical History:   Diagnosis Date   • Appendicitis     H/O SUBACUTE PERFORATED APPENDICITIS   • Asthma     RESCUE INHALER, HASN'T USED   • Crohn's disease (CMS/HCC)        Past Surgical History:   Procedure Laterality Date   • APPENDECTOMY N/A 6/14/2016    Procedure: APPENDECTOMY LAPAROSCOPIC;   Surgeon: Donnie Carlson MD;  Location: [facility];  Service:    • COLONOSCOPY      age 10   • COLONOSCOPY N/A 12/21/2018    Congested mucosa with erosions/ulceration in the terminal ileum and at the ileoceal valve. Path: Extensive acute inflammation, reactive changes of the surface epithelium, blunting and atrophy of the villi.    • TONSILLECTOMY         Scheduled Meds:  Outpatient Encounter Medications as of 5/21/2019   Medication Sig Dispense Refill   • dicyclomine (BENTYL) 10 MG capsule Take 1 capsule by mouth 3 (Three) Times a Day. (Patient taking differently: Take 10 mg by mouth 3 (Three) Times a Day As Needed.) 30 capsule 0   • HUMIRA PEN 40 MG/0.4ML Pen-injector Kit Inject 1 pen under the skin into the appropriate area as directed Every 14 (Fourteen) Days.     • lisdexamfetamine (VYVANSE) 40 MG capsule Take 40 mg by mouth Every Morning     • LO LOESTRIN FE 1 MG-10 MCG / 10 MCG tablet Take 1 tablet by mouth daily.     • ondansetron (ZOFRAN) 4 MG tablet Take 1 tablet by mouth Every 6 (Six) Hours As Needed for Nausea or Vomiting. 12 tablet 0   • TRINTELLIX 10 MG tablet      • hyoscyamine (ANASPAZ,LEVSIN) 0.125 MG tablet Take 1 tablet by mouth 4 (Four) Times a Day Before Meals & at Bedtime As Needed for Cramping. 120 tablet 5     No facility-administered encounter medications on file as of 5/21/2019.        Continuous Infusions:  No current facility-administered medications for this visit.     PRN Meds:.    No Known Allergies    Social History     Socioeconomic History   • Marital status: Single     Spouse name: Not on file   • Number of children: Not on file   • Years of education: Not on file   • Highest education level: Not on file   Tobacco Use   • Smoking status: Never Smoker   • Smokeless tobacco: Never Used   Substance and Sexual Activity   • Alcohol use: No   • Drug use: No   • Sexual activity: Defer       Family History   Problem Relation Age of Onset   • Cancer Maternal Grandfather    • COPD  Maternal Grandfather    • Miscarriages / Stillbirths Mother    • Crohn's disease Mother        Review of Systems   Constitutional: Positive for appetite change. Negative for activity change, fatigue, fever and unexpected weight change.   HENT: Negative for trouble swallowing.    Respiratory: Negative for apnea, cough, choking, chest tightness, shortness of breath and wheezing.    Cardiovascular: Negative for chest pain, palpitations and leg swelling.   Gastrointestinal: Positive for abdominal pain and nausea. Negative for abdominal distention, anal bleeding, blood in stool, constipation, diarrhea, rectal pain and vomiting.       Vitals:    05/21/19 1059   BP: 104/66   Temp: 97.9 °F (36.6 °C)       Physical Exam   Constitutional: She is oriented to person, place, and time. She appears well-developed and well-nourished.   Eyes: Pupils are equal, round, and reactive to light.   Cardiovascular: Normal rate, regular rhythm and normal heart sounds.   Pulmonary/Chest: Effort normal and breath sounds normal. No respiratory distress. She has no wheezes.   Abdominal: Soft. Bowel sounds are normal. She exhibits no distension and no mass. There is tenderness. There is no guarding. No hernia.   Tenderness to palpation in the area periumbilical area.   Musculoskeletal: Normal range of motion.   Neurological: She is alert and oriented to person, place, and time.   Skin: Skin is warm and dry. Capillary refill takes less than 2 seconds.   Psychiatric: She has a normal mood and affect. Her behavior is normal.       No images are attached to the encounter.    Adrienne was seen today for follow-up and crohn's disease.    Diagnoses and all orders for this visit:    Crohn's disease of both small and large intestine with abscess (CMS/HCC)    Periumbilical abdominal pain    Nausea    Other orders  -     hyoscyamine (ANASPAZ,LEVSIN) 0.125 MG tablet; Take 1 tablet by mouth 4 (Four) Times a Day Before Meals & at Bedtime As Needed for  Cramping.    Impression:    This is a pleasant 23-year-old female with a history of Crohn's disease diagnosed at age 10 presenting today with periumbilical abdominal pain and nausea status post visit to the emergency room as summarized above.  I discussed patient issues w/ Dr. Meléndez.  At this point we will have her complete Prometheus monitor to assess for disease severity.  Continue Humira injections.  Trial of reduced strength antispasmodic.  Return to clinic in 4 weeks.  If no improvement consider small bowel follow-through versus amitriptyline prior to bedtime.  Patient is agreeable.

## 2019-09-03 ENCOUNTER — TELEPHONE (OUTPATIENT)
Dept: GASTROENTEROLOGY | Facility: CLINIC | Age: 24
End: 2019-09-03

## 2019-09-03 NOTE — TELEPHONE ENCOUNTER
----- Message from Estefania Trejo sent at 9/3/2019  9:39 AM EDT -----  Regarding: Med advice  Contact: [phone]  Medication frequency for Humira. Missed injection on Friday and not sure how to proceed.

## 2019-11-06 RX ORDER — ADALIMUMAB 40MG/0.4ML
KIT SUBCUTANEOUS
OUTPATIENT
Start: 2019-11-06

## 2019-11-15 ENCOUNTER — TELEPHONE (OUTPATIENT)
Dept: GASTROENTEROLOGY | Facility: CLINIC | Age: 24
End: 2019-11-15

## 2019-11-15 NOTE — TELEPHONE ENCOUNTER
Called pt's father back (ok per ROSE MARIE) and left a message that pt was last seen in May 2019 but she was to have some labs drawn and follow back up in 1 month. Advised I do not see that the labs were draw or that she ever followed back up. Advised she needs to make an appt to follow-up and can refill the medication. Will await call back.

## 2019-11-15 NOTE — TELEPHONE ENCOUNTER
----- Message from Phil Erwin sent at 11/15/2019  2:21 PM EST -----  Regarding: oziel  Contact: [phone]  Pt is calling about the Humira.

## 2019-12-11 ENCOUNTER — TELEPHONE (OUTPATIENT)
Dept: GASTROENTEROLOGY | Facility: CLINIC | Age: 24
End: 2019-12-11

## 2019-12-11 ENCOUNTER — OFFICE VISIT (OUTPATIENT)
Dept: GASTROENTEROLOGY | Facility: CLINIC | Age: 24
End: 2019-12-11

## 2019-12-11 VITALS
SYSTOLIC BLOOD PRESSURE: 118 MMHG | DIASTOLIC BLOOD PRESSURE: 76 MMHG | BODY MASS INDEX: 20 KG/M2 | WEIGHT: 132 LBS | TEMPERATURE: 97.4 F | HEIGHT: 68 IN

## 2019-12-11 DIAGNOSIS — R10.31 RIGHT LOWER QUADRANT ABDOMINAL PAIN: ICD-10-CM

## 2019-12-11 DIAGNOSIS — K50.814 CROHN'S DISEASE OF BOTH SMALL AND LARGE INTESTINE WITH ABSCESS (HCC): Primary | ICD-10-CM

## 2019-12-11 LAB
ALBUMIN SERPL-MCNC: 5 G/DL (ref 3.5–5.2)
ALBUMIN/GLOB SERPL: 1.7 G/DL
ALP SERPL-CCNC: 56 U/L (ref 39–117)
ALT SERPL-CCNC: 9 U/L (ref 1–33)
AST SERPL-CCNC: 14 U/L (ref 1–32)
BASOPHILS # BLD AUTO: 0.05 10*3/MM3 (ref 0–0.2)
BASOPHILS NFR BLD AUTO: 0.8 % (ref 0–1.5)
BILIRUB SERPL-MCNC: 0.6 MG/DL (ref 0.2–1.2)
BUN SERPL-MCNC: 15 MG/DL (ref 6–20)
BUN/CREAT SERPL: 19.5 (ref 7–25)
CALCIUM SERPL-MCNC: 10 MG/DL (ref 8.6–10.5)
CHLORIDE SERPL-SCNC: 102 MMOL/L (ref 98–107)
CO2 SERPL-SCNC: 27.9 MMOL/L (ref 22–29)
CREAT SERPL-MCNC: 0.77 MG/DL (ref 0.57–1)
EOSINOPHIL # BLD AUTO: 0.13 10*3/MM3 (ref 0–0.4)
EOSINOPHIL NFR BLD AUTO: 2.1 % (ref 0.3–6.2)
ERYTHROCYTE [DISTWIDTH] IN BLOOD BY AUTOMATED COUNT: 13 % (ref 12.3–15.4)
GLOBULIN SER CALC-MCNC: 3 GM/DL
GLUCOSE SERPL-MCNC: 73 MG/DL (ref 65–99)
HCT VFR BLD AUTO: 46.9 % (ref 34–46.6)
HGB BLD-MCNC: 15.9 G/DL (ref 12–15.9)
IMM GRANULOCYTES # BLD AUTO: 0.02 10*3/MM3 (ref 0–0.05)
IMM GRANULOCYTES NFR BLD AUTO: 0.3 % (ref 0–0.5)
LYMPHOCYTES # BLD AUTO: 2.26 10*3/MM3 (ref 0.7–3.1)
LYMPHOCYTES NFR BLD AUTO: 37.3 % (ref 19.6–45.3)
MCH RBC QN AUTO: 31.6 PG (ref 26.6–33)
MCHC RBC AUTO-ENTMCNC: 33.9 G/DL (ref 31.5–35.7)
MCV RBC AUTO: 93.2 FL (ref 79–97)
MONOCYTES # BLD AUTO: 0.38 10*3/MM3 (ref 0.1–0.9)
MONOCYTES NFR BLD AUTO: 6.3 % (ref 5–12)
NEUTROPHILS # BLD AUTO: 3.22 10*3/MM3 (ref 1.7–7)
NEUTROPHILS NFR BLD AUTO: 53.2 % (ref 42.7–76)
NRBC BLD AUTO-RTO: 0.2 /100 WBC (ref 0–0.2)
PLATELET # BLD AUTO: 275 10*3/MM3 (ref 140–450)
POTASSIUM SERPL-SCNC: 4.7 MMOL/L (ref 3.5–5.2)
PROT SERPL-MCNC: 8 G/DL (ref 6–8.5)
RBC # BLD AUTO: 5.03 10*6/MM3 (ref 3.77–5.28)
SODIUM SERPL-SCNC: 140 MMOL/L (ref 136–145)
WBC # BLD AUTO: 6.06 10*3/MM3 (ref 3.4–10.8)

## 2019-12-11 PROCEDURE — 99214 OFFICE O/P EST MOD 30 MIN: CPT | Performed by: INTERNAL MEDICINE

## 2019-12-11 NOTE — TELEPHONE ENCOUNTER
----- Message from Vaibhav Meléndez MD sent at 12/11/2019 11:50 AM EST -----  Regarding: Prometheus labs  Can we please arrange for Prometheus Monitr panel as well as Humira (ADA) antibodies and levels to be done day prior to one of her Humira injections.  Thanks.

## 2019-12-11 NOTE — TELEPHONE ENCOUNTER
Patient called. Advised she will need to have lab work drawn at [facility] on 12/19. Advised she will need to  her prescription at the office and may do so on the 19th before she gets her lab work drawn. She verb understanding. Paperwork placed on Dr. Meléndez's desk for signature.

## 2020-01-02 ENCOUNTER — TELEPHONE (OUTPATIENT)
Dept: GASTROENTEROLOGY | Facility: CLINIC | Age: 25
End: 2020-01-02

## 2020-01-02 NOTE — TELEPHONE ENCOUNTER
Patient called, no answer, left message her lab work was normal. Advised to call back for questions/concerns.

## 2020-01-14 ENCOUNTER — TELEPHONE (OUTPATIENT)
Dept: GASTROENTEROLOGY | Facility: CLINIC | Age: 25
End: 2020-01-14

## 2020-01-14 NOTE — TELEPHONE ENCOUNTER
----- Message from Phil Erwin sent at 1/14/2020  3:21 PM EST -----  Regarding: Adalimumab (HUMIRA PEN)  Contact: [phone]  Pt has changed pharmacy and needs a new prescription [facility]  Id # [id number] phone [phone]

## 2020-01-16 DIAGNOSIS — K50.014 CROHN'S DISEASE OF SMALL INTESTINE WITH ABSCESS (HCC): ICD-10-CM

## 2020-01-22 RX ORDER — ADALIMUMAB 40MG/0.4ML
KIT SUBCUTANEOUS
Qty: 1 EACH | OUTPATIENT
Start: 2020-01-22

## 2020-08-06 RX ORDER — ADALIMUMAB 40MG/0.4ML
KIT SUBCUTANEOUS
Qty: 6 EACH | Refills: 4 | Status: SHIPPED | OUTPATIENT
Start: 2020-08-06 | End: 2021-07-15

## 2021-04-06 ENCOUNTER — TELEPHONE (OUTPATIENT)
Dept: GASTROENTEROLOGY | Facility: CLINIC | Age: 26
End: 2021-04-06

## 2021-04-06 NOTE — TELEPHONE ENCOUNTER
PA for Humira approved through [facility]. Patient notified.     Approvedtoday  CaseId:[id number];Status:Approved;Review Type:Prior Auth;Coverage Start Date:03/07/2021;Coverage End Date:04/06/2022

## 2021-04-16 ENCOUNTER — BULK ORDERING (OUTPATIENT)
Dept: CASE MANAGEMENT | Facility: OTHER | Age: 26
End: 2021-04-16

## 2021-04-16 DIAGNOSIS — Z23 IMMUNIZATION DUE: ICD-10-CM

## 2021-07-15 RX ORDER — ADALIMUMAB 40MG/0.4ML
KIT SUBCUTANEOUS
Qty: 1 EACH | Refills: 1 | Status: SHIPPED | OUTPATIENT
Start: 2021-07-15 | End: 2021-09-02 | Stop reason: SDUPTHER

## 2021-09-02 ENCOUNTER — OFFICE VISIT (OUTPATIENT)
Dept: GASTROENTEROLOGY | Facility: CLINIC | Age: 26
End: 2021-09-02

## 2021-09-02 ENCOUNTER — TELEPHONE (OUTPATIENT)
Dept: GASTROENTEROLOGY | Facility: CLINIC | Age: 26
End: 2021-09-02

## 2021-09-02 VITALS — HEIGHT: 69 IN | BODY MASS INDEX: 19.46 KG/M2 | TEMPERATURE: 97.5 F | WEIGHT: 131.4 LBS

## 2021-09-02 DIAGNOSIS — K62.5 RECTAL BLEEDING: ICD-10-CM

## 2021-09-02 DIAGNOSIS — K50.814 CROHN'S DISEASE OF BOTH SMALL AND LARGE INTESTINE WITH ABSCESS (HCC): Primary | ICD-10-CM

## 2021-09-02 DIAGNOSIS — R19.7 DIARRHEA, UNSPECIFIED TYPE: ICD-10-CM

## 2021-09-02 DIAGNOSIS — R10.30 LOWER ABDOMINAL PAIN: ICD-10-CM

## 2021-09-02 PROCEDURE — 99214 OFFICE O/P EST MOD 30 MIN: CPT | Performed by: NURSE PRACTITIONER

## 2021-09-02 RX ORDER — ESCITALOPRAM OXALATE 10 MG/1
10 TABLET ORAL DAILY
COMMUNITY
Start: 2021-08-05

## 2021-09-02 NOTE — TELEPHONE ENCOUNTER
Per IM from CLAUDIA Addison - Can you help this nice lady get Humira antibodies and drug levels drawn prior to her next injection next week. Would have to be done at [facility].    Called pt and advised of Cyn TAYLOR's note.  Pt verbalized understanding.    Orders placed for Humira ADA and CEFERINO.  Msg sent to CLAUDIA Addison to cosign.

## 2021-09-02 NOTE — PROGRESS NOTES
Chief Complaint   Patient presents with   • Crohn's Disease   • Abdominal Pain     HPI    Adrienne Rodrigues is a  25 y.o. female here for a follow up visit for today for crohn's disease.  She carries diagnosis of CD since age 12, with involvement of terminal ileum.     This patient follows with Dr. Meléndez, new to me.    She has been maintained on Humira biweekly injections since 2018.    Patient has missed several follow-ups.  She has been having issues with abdominal pain, diarrhea, and nausea for at least 1 year.  She is compliant with Humira injections however.  She feels like it is lost efficacy.  Abdominal pain is localized to the mid abdominal area but radiates to the lower abdominal area described as an aching sensation that comes and goes.  Symptoms made worse with eating.  Patient unsure if she is lost weight but her family has been commenting that she looks thinner.      She has been putting off seeing us for unclear reasons decided to schedule a visit today based on her family members concerns.  Vomiting happens at least twice a week.  She reports trying Bentyl in the past for abdominal discomfort which helped.  She would also like a prescription for Zofran.  Recently developed a rash on her back and chest.  No rectal bleeding.    Past Medical History:   Diagnosis Date   • Appendicitis     H/O SUBACUTE PERFORATED APPENDICITIS   • Asthma     RESCUE INHALER, HASN'T USED   • Crohn's disease (CMS/HCC)        Past Surgical History:   Procedure Laterality Date   • APPENDECTOMY N/A 6/14/2016    Procedure: APPENDECTOMY LAPAROSCOPIC;  Surgeon: Donnie Carlson MD;  Location: [facility];  Service:    • COLONOSCOPY      age 10   • COLONOSCOPY N/A 12/21/2018    Congested mucosa with erosions/ulceration in the terminal ileum and at the ileoceal valve. Path: Extensive acute inflammation, reactive changes of the surface epithelium, blunting and atrophy of the villi.    • TONSILLECTOMY         Scheduled Meds:      Continuous Infusions: No current facility-administered medications for this visit.      PRN Meds:     No Known Allergies    Social History     Socioeconomic History   • Marital status: Single     Spouse name: Not on file   • Number of children: Not on file   • Years of education: Not on file   • Highest education level: Not on file   Tobacco Use   • Smoking status: Never Smoker   • Smokeless tobacco: Never Used   Substance and Sexual Activity   • Alcohol use: No   • Drug use: No   • Sexual activity: Defer       Family History   Problem Relation Age of Onset   • Cancer Maternal Grandfather    • COPD Maternal Grandfather    • Miscarriages / Stillbirths Mother    • Crohn's disease Mother        Review of Systems   Constitutional: Negative for activity change, appetite change, fatigue, fever and unexpected weight change.   HENT: Negative for trouble swallowing.    Respiratory: Negative for apnea, cough, choking, chest tightness, shortness of breath and wheezing.    Cardiovascular: Negative for chest pain, palpitations and leg swelling.   Gastrointestinal: Positive for abdominal pain, diarrhea, nausea and vomiting. Negative for abdominal distention, anal bleeding, blood in stool, constipation and rectal pain.       Vitals:    09/02/21 1404   Temp: 97.5 °F (36.4 °C)       Physical Exam  Constitutional:       Appearance: She is well-developed.   Abdominal:      General: Bowel sounds are normal. There is no distension.      Palpations: Abdomen is soft. There is no mass.      Tenderness: There is abdominal tenderness. There is no guarding.      Hernia: No hernia is present.       Skin:     General: Skin is warm and dry.      Capillary Refill: Capillary refill takes less than 2 seconds.   Neurological:      Mental Status: She is alert and oriented to person, place, and time.   Psychiatric:         Behavior: Behavior normal.     Assessment    1. Crohn's disease of both small and large intestine with abscess (CMS/HCC)    -  CBC & Differential  - Comprehensive Metabolic Panel  - C-reactive Protein  - Sedimentation Rate  - Vitamin B12  - Folate  - CT Abdomen Pelvis With Contrast Enterography  - Clostridium Difficile Toxin, PCR - Stool, Per Rectum    2. Lower abdominal pain    - CBC & Differential  - Comprehensive Metabolic Panel  - C-reactive Protein  - Sedimentation Rate  - CT Abdomen Pelvis With Contrast Enterography  - Clostridium Difficile Toxin, PCR - Stool, Per Rectum    3. Diarrhea, unspecified type    - C-reactive Protein  - Sedimentation Rate  - CT Abdomen Pelvis With Contrast Enterography  - Gastrointestinal Panel, PCR - Stool, Per Rectum  - Calprotectin, Fecal - Stool, Per Rectum  - Clostridium Difficile Toxin, PCR - Stool, Per Rectum    4. Rectal bleeding    - CT Abdomen Pelvis With Contrast Enterography      Plan    Adrienne is seen today in follow up for the above complaints.   At this point recommend CT enterography to assess for active Crohn's.  Complete stool testing to rule out infectious pathogens and assess colonic inflammation.  Labs today to include CBC, CMP, CRP, and sed rate.  Trial of Bentyl.  Zofran provided for relief in the interim.  Continue Humira injections biweekly.  Humira drug levels and antibodies to be drawn prior to next dose due next week.  Follow-up and further recommendations pending the aforementioned work-up.         RUSSELL Lynn  [facility]  [address] [place] [address]  Office: [phone]

## 2021-09-03 LAB
ALBUMIN SERPL-MCNC: 4.9 G/DL (ref 3.9–5)
ALBUMIN/GLOB SERPL: 1.6 {RATIO} (ref 1.2–2.2)
ALP SERPL-CCNC: 61 IU/L (ref 48–121)
ALT SERPL-CCNC: 7 IU/L (ref 0–32)
AST SERPL-CCNC: 16 IU/L (ref 0–40)
BASOPHILS # BLD AUTO: 0 X10E3/UL (ref 0–0.2)
BASOPHILS NFR BLD AUTO: 1 %
BILIRUB SERPL-MCNC: 0.6 MG/DL (ref 0–1.2)
BUN SERPL-MCNC: 9 MG/DL (ref 6–20)
BUN/CREAT SERPL: 10 (ref 9–23)
CALCIUM SERPL-MCNC: 10 MG/DL (ref 8.7–10.2)
CHLORIDE SERPL-SCNC: 102 MMOL/L (ref 96–106)
CO2 SERPL-SCNC: 23 MMOL/L (ref 20–29)
CREAT SERPL-MCNC: 0.92 MG/DL (ref 0.57–1)
CRP SERPL-MCNC: <1 MG/L (ref 0–10)
EOSINOPHIL # BLD AUTO: 0.1 X10E3/UL (ref 0–0.4)
EOSINOPHIL NFR BLD AUTO: 1 %
ERYTHROCYTE [DISTWIDTH] IN BLOOD BY AUTOMATED COUNT: 12.7 % (ref 11.7–15.4)
ERYTHROCYTE [SEDIMENTATION RATE] IN BLOOD BY WESTERGREN METHOD: 2 MM/HR (ref 0–32)
FOLATE SERPL-MCNC: 12.6 NG/ML
GLOBULIN SER CALC-MCNC: 3.1 G/DL (ref 1.5–4.5)
GLUCOSE SERPL-MCNC: 65 MG/DL (ref 65–99)
HCT VFR BLD AUTO: 44.4 % (ref 34–46.6)
HGB BLD-MCNC: 14.2 G/DL (ref 11.1–15.9)
IMM GRANULOCYTES # BLD AUTO: 0 X10E3/UL (ref 0–0.1)
IMM GRANULOCYTES NFR BLD AUTO: 0 %
LYMPHOCYTES # BLD AUTO: 1.8 X10E3/UL (ref 0.7–3.1)
LYMPHOCYTES NFR BLD AUTO: 29 %
MCH RBC QN AUTO: 28.9 PG (ref 26.6–33)
MCHC RBC AUTO-ENTMCNC: 32 G/DL (ref 31.5–35.7)
MCV RBC AUTO: 90 FL (ref 79–97)
MONOCYTES # BLD AUTO: 0.4 X10E3/UL (ref 0.1–0.9)
MONOCYTES NFR BLD AUTO: 6 %
NEUTROPHILS # BLD AUTO: 3.9 X10E3/UL (ref 1.4–7)
NEUTROPHILS NFR BLD AUTO: 63 %
PLATELET # BLD AUTO: 290 X10E3/UL (ref 150–450)
POTASSIUM SERPL-SCNC: 4 MMOL/L (ref 3.5–5.2)
PROT SERPL-MCNC: 8 G/DL (ref 6–8.5)
RBC # BLD AUTO: 4.92 X10E6/UL (ref 3.77–5.28)
SODIUM SERPL-SCNC: 140 MMOL/L (ref 134–144)
VIT B12 SERPL-MCNC: 622 PG/ML (ref 232–1245)
WBC # BLD AUTO: 6.1 X10E3/UL (ref 3.4–10.8)

## 2021-09-03 RX ORDER — DICYCLOMINE HYDROCHLORIDE 10 MG/1
10 CAPSULE ORAL 3 TIMES DAILY PRN
Qty: 120 CAPSULE | Refills: 3 | Status: SHIPPED | OUTPATIENT
Start: 2021-09-03

## 2021-09-03 RX ORDER — ONDANSETRON 4 MG/1
4 TABLET, ORALLY DISINTEGRATING ORAL EVERY 8 HOURS PRN
Qty: 25 TABLET | Refills: 2 | Status: SHIPPED | OUTPATIENT
Start: 2021-09-03

## 2021-09-13 NOTE — PROGRESS NOTES
Let the patient know her lab work is normal.  Please complete stool testing.  Has she scheduled CTE?

## 2021-09-16 ENCOUNTER — TELEPHONE (OUTPATIENT)
Dept: GASTROENTEROLOGY | Facility: CLINIC | Age: 26
End: 2021-09-16

## 2021-09-16 NOTE — TELEPHONE ENCOUNTER
Patient called. Advised as per Cyn's note. She states sh turned her stool into the lab yesterday.   States she had called [facility] yesterday and was told there was  No order. Advised to call again today and if she has any issues to call back.

## 2021-09-16 NOTE — TELEPHONE ENCOUNTER
----- Message from RUSSELL Lynn sent at 9/13/2021  3:49 PM EDT -----  Let the patient know her lab work is normal.  Please complete stool testing.  Has she scheduled CTE?

## 2021-09-18 LAB
C DIFF TOX A+B STL QL IA: NEGATIVE
SPECIMEN STATUS: NORMAL

## 2021-09-19 LAB

## 2021-09-20 NOTE — PROGRESS NOTES
I saw Cora in follow-up for Crohn's disease.  She has been having abdominal pain diarrhea and nausea for 1 year.  She is taking Humira every 2 weeks.  Stool testing was negative for infection.  Fecal calprotectin 173.  CRP and sed rate are normal.  I ordered a CTE but has not been completed yet. I have also ordered Prometheus serology to be done prior to her next Humira injection to assess antibodies and drug levels.  Do you want me to start her on steroids?  Or await additional work-up?

## 2021-10-12 ENCOUNTER — TELEPHONE (OUTPATIENT)
Dept: GASTROENTEROLOGY | Facility: CLINIC | Age: 26
End: 2021-10-12

## 2021-10-12 NOTE — TELEPHONE ENCOUNTER
----- Message from Phil Holguin sent at 10/12/2021 11:43 AM EDT -----  Regarding: scan  Contact: [phone]  Does she need to have the scan before the appointment ?  Appointment  Monday Oct 18, 2021

## 2021-10-13 NOTE — TELEPHONE ENCOUNTER
Cyn Krueger, Pearl Barajas, RN  Caller: Unspecified (Yesterday,  1:05 PM)  Yes would be helpful for her to have imaging before I see her in the office.  She is scheduled?

## 2021-10-15 ENCOUNTER — TELEPHONE (OUTPATIENT)
Dept: GASTROENTEROLOGY | Facility: CLINIC | Age: 26
End: 2021-10-15

## 2021-10-15 ENCOUNTER — HOSPITAL ENCOUNTER (OUTPATIENT)
Dept: CT IMAGING | Facility: HOSPITAL | Age: 26
Discharge: HOME | End: 2021-10-15

## 2021-10-15 NOTE — TELEPHONE ENCOUNTER
----- Message from Angelica Ferguson RN sent at 10/15/2021  4:23 PM EDT -----  Regarding: FW: ct  Contact: [phone]  Rika pt.   ----- Message -----  From: Luba Kat [employer] Rep  Sent: 10/15/2021   4:20 PM EDT  To: [facility] 1 Pool  Subject: ct                                               Could not get  a proper iv on pt and pt left ct not completed dave or rmaonita can be contacted

## 2021-10-25 NOTE — TELEPHONE ENCOUNTER
Called and spoke with  [facility].  Advised they would contact the pt today to reschedule.

## 2021-11-03 ENCOUNTER — TELEPHONE (OUTPATIENT)
Dept: GASTROENTEROLOGY | Facility: CLINIC | Age: 26
End: 2021-11-03

## 2021-11-03 NOTE — TELEPHONE ENCOUNTER
----- Message from Phil Erwin Rep sent at 11/3/2021  3:43 PM EDT -----  Regarding: CT ORDER  Contact: [phone]  Pt needs ct order faxed to [facility] at [phone] att Addison Jay.

## 2021-11-05 ENCOUNTER — TELEPHONE (OUTPATIENT)
Dept: GASTROENTEROLOGY | Facility: CLINIC | Age: 26
End: 2021-11-05

## 2021-11-05 RX ORDER — ADALIMUMAB 40MG/0.4ML
KIT SUBCUTANEOUS
Qty: 6 EACH | Refills: 1 | Status: SHIPPED | OUTPATIENT
Start: 2021-11-05 | End: 2022-04-11

## 2021-11-05 NOTE — TELEPHONE ENCOUNTER
[id number]  auth number for ct abdomen with contrast cpt [id number] being done at [facility] on November 8 auth good November 5 2021 thru December 4 2021

## 2021-11-05 NOTE — TELEPHONE ENCOUNTER
Spoke with CLAUDIA Addison, ok to refill Humira.  Also, per CLAUDIA Addison have pt to to [facility] and have Prometheus labs drawn.    Called and spoke with pt's dad, (ok per ROSE MARIE) and advised of CLAUDIA Addison recommendations.  He verbalized understanding.     Pt's dad states that pt went to have CTscan done and they could not get IV access.  He is asking if she needs CT done with dye. Advised will send a msg to CLAUDIA Addison.

## 2021-11-05 NOTE — TELEPHONE ENCOUNTER
----- Message from Phil Palm sent at 11/5/2021 11:01 AM EDT -----  Regarding: Adalimumab (HUMIRA PEN) 40 MG/0.4ML Pen-injector Kit  Contact: [phone]  Patient needs refill for medication. Please give patient's father a call back once it is completed.

## 2021-11-05 NOTE — TELEPHONE ENCOUNTER
----- Message from Phil Erwin sent at 11/5/2021  2:57 PM EDT -----  Regarding: oziel  Contact: [phone]  Pt father Henrry is calling he wants to make sure it gets called in before we close today.

## 2021-11-08 NOTE — TELEPHONE ENCOUNTER
Per Cyn Krueger, Shira Richey, RN  Caller: Unspecified (3 days ago,  3:47 PM)  Yes should be contrasted.         Called pt and spoke with dad (ok per ROSE MARIE) and advised of Cyn TAYLOR's note. He verbalized understanding.

## 2021-11-18 ENCOUNTER — TELEPHONE (OUTPATIENT)
Dept: GASTROENTEROLOGY | Facility: CLINIC | Age: 26
End: 2021-11-18

## 2021-12-02 ENCOUNTER — TELEPHONE (OUTPATIENT)
Dept: GASTROENTEROLOGY | Facility: CLINIC | Age: 26
End: 2021-12-02

## 2021-12-02 NOTE — TELEPHONE ENCOUNTER
Called pt and advised of Dr Meléndez's note.  Pt verbalized understanding.     Follow up appt made with Dr Meléndez, 1/19/22 @9am.  Also, added to wait list for sooner appt.

## 2021-12-02 NOTE — TELEPHONE ENCOUNTER
----- Message from RUSSELL Lynn sent at 11/30/2021  2:58 PM EST -----  Please inform the patient that her CT scan is normal.  Make sure she has a follow-up with Dr. Meléndez.

## 2022-03-24 ENCOUNTER — TELEPHONE (OUTPATIENT)
Dept: GASTROENTEROLOGY | Facility: CLINIC | Age: 27
End: 2022-03-24

## 2022-03-24 NOTE — TELEPHONE ENCOUNTER
Received a PA request for Pt HUMIRA. PT has not completed a TB gold or Hep Labs since 2018. Also looks like Humira antibodies was suppose to be completed but it has not been done.

## 2022-03-25 NOTE — TELEPHONE ENCOUNTER
CaseId:[id number];Status:Approved;Review Type:Prior Auth;Coverage Start Date:02/22/2022;Coverage End Date:03/24/2023;

## 2022-04-11 RX ORDER — ADALIMUMAB 40MG/0.4ML
KIT SUBCUTANEOUS
Qty: 2 EACH | Refills: 4 | Status: SHIPPED | OUTPATIENT
Start: 2022-04-11

## 2022-04-19 ENCOUNTER — TELEPHONE (OUTPATIENT)
Dept: GASTROENTEROLOGY | Facility: CLINIC | Age: 27
End: 2022-04-19

## 2022-04-19 NOTE — TELEPHONE ENCOUNTER
[facility]ed [facility] they don't have the labs we need.  I have scheduled her a lab apt.    Cyn,  What labs will she need for the Humira?

## 2022-04-20 DIAGNOSIS — K50.814 CROHN'S DISEASE OF BOTH SMALL AND LARGE INTESTINE WITH ABSCESS: Primary | ICD-10-CM

## 2022-04-26 ENCOUNTER — LAB (OUTPATIENT)
Dept: GASTROENTEROLOGY | Facility: CLINIC | Age: 27
End: 2022-04-26

## 2022-04-27 LAB
ALBUMIN SERPL-MCNC: 5.2 G/DL (ref 3.9–5)
ALBUMIN/GLOB SERPL: 1.8 {RATIO} (ref 1.2–2.2)
ALP SERPL-CCNC: 72 IU/L (ref 44–121)
ALT SERPL-CCNC: 9 IU/L (ref 0–32)
AST SERPL-CCNC: 17 IU/L (ref 0–40)
BASOPHILS # BLD AUTO: 0 X10E3/UL (ref 0–0.2)
BASOPHILS NFR BLD AUTO: 1 %
BILIRUB SERPL-MCNC: 0.7 MG/DL (ref 0–1.2)
BUN SERPL-MCNC: 13 MG/DL (ref 6–20)
BUN/CREAT SERPL: 15 (ref 9–23)
CALCIUM SERPL-MCNC: 10.1 MG/DL (ref 8.7–10.2)
CHLORIDE SERPL-SCNC: 99 MMOL/L (ref 96–106)
CO2 SERPL-SCNC: 24 MMOL/L (ref 20–29)
CREAT SERPL-MCNC: 0.85 MG/DL (ref 0.57–1)
CRP SERPL-MCNC: 2 MG/L (ref 0–10)
EGFRCR SERPLBLD CKD-EPI 2021: 98 ML/MIN/1.73
EOSINOPHIL # BLD AUTO: 0.1 X10E3/UL (ref 0–0.4)
EOSINOPHIL NFR BLD AUTO: 1 %
ERYTHROCYTE [DISTWIDTH] IN BLOOD BY AUTOMATED COUNT: 12.2 % (ref 11.7–15.4)
GLOBULIN SER CALC-MCNC: 2.9 G/DL (ref 1.5–4.5)
GLUCOSE SERPL-MCNC: 74 MG/DL (ref 65–99)
HBV CORE AB SERPL QL IA: NEGATIVE
HBV CORE IGM SERPL QL IA: NEGATIVE
HBV E AB SERPL QL IA: NEGATIVE
HBV E AG SERPL QL IA: NEGATIVE
HBV SURFACE AB SER QL: NON REACTIVE
HBV SURFACE AG SERPL QL IA: NEGATIVE
HCT VFR BLD AUTO: 44.9 % (ref 34–46.6)
HGB BLD-MCNC: 14.8 G/DL (ref 11.1–15.9)
IMM GRANULOCYTES # BLD AUTO: 0 X10E3/UL (ref 0–0.1)
IMM GRANULOCYTES NFR BLD AUTO: 0 %
LYMPHOCYTES # BLD AUTO: 1.1 X10E3/UL (ref 0.7–3.1)
LYMPHOCYTES NFR BLD AUTO: 18 %
MCH RBC QN AUTO: 29.4 PG (ref 26.6–33)
MCHC RBC AUTO-ENTMCNC: 33 G/DL (ref 31.5–35.7)
MCV RBC AUTO: 89 FL (ref 79–97)
MONOCYTES # BLD AUTO: 0.3 X10E3/UL (ref 0.1–0.9)
MONOCYTES NFR BLD AUTO: 5 %
NEUTROPHILS # BLD AUTO: 4.6 X10E3/UL (ref 1.4–7)
NEUTROPHILS NFR BLD AUTO: 75 %
PLATELET # BLD AUTO: 305 X10E3/UL (ref 150–450)
POTASSIUM SERPL-SCNC: 4.5 MMOL/L (ref 3.5–5.2)
PROT SERPL-MCNC: 8.1 G/DL (ref 6–8.5)
RBC # BLD AUTO: 5.04 X10E6/UL (ref 3.77–5.28)
SODIUM SERPL-SCNC: 139 MMOL/L (ref 134–144)
WBC # BLD AUTO: 6.1 X10E3/UL (ref 3.4–10.8)

## 2022-04-28 LAB
GAMMA INTERFERON BACKGROUND BLD IA-ACNC: 0.04 IU/ML
M TB IFN-G BLD-IMP: NEGATIVE
M TB IFN-G CD4+ BCKGRND COR BLD-ACNC: 0.06 IU/ML
M TB IFN-G CD4+CD8+ BCKGRND COR BLD-ACNC: 0.05 IU/ML
MITOGEN IGNF BLD-ACNC: >10 IU/ML
QUANTIFERON INCUBATION: NORMAL
SERVICE CMNT-IMP: NORMAL

## 2022-04-29 NOTE — PROGRESS NOTES
Nothing worrisome on labs.  Make sure she has a follow-up in office as she is not been seen in a while.  Have her come in and see Lisa Phillips ([facility]

## 2022-05-02 ENCOUNTER — TELEPHONE (OUTPATIENT)
Dept: GASTROENTEROLOGY | Facility: CLINIC | Age: 27
End: 2022-05-02

## 2022-05-02 NOTE — TELEPHONE ENCOUNTER
----- Message from RUSSELL Lynn sent at 4/29/2022 11:59 AM EDT -----  Nothing worrisome on labs.  Make sure she has a follow-up in office as she is not been seen in a while.  Have her come in and see Lisa Phillips ([facility] patient).

## 2022-05-02 NOTE — TELEPHONE ENCOUNTER
Called pt and advised of Cyn NP's note.  Pt verbalized understanding.    Pt has OV with Dr Meléndez on 5/10@3pm.

## 2022-05-10 ENCOUNTER — OFFICE VISIT (OUTPATIENT)
Dept: GASTROENTEROLOGY | Facility: CLINIC | Age: 27
End: 2022-05-10

## 2022-05-10 VITALS
TEMPERATURE: 98 F | SYSTOLIC BLOOD PRESSURE: 111 MMHG | WEIGHT: 134.3 LBS | BODY MASS INDEX: 19.89 KG/M2 | DIASTOLIC BLOOD PRESSURE: 83 MMHG | HEIGHT: 69 IN

## 2022-05-10 DIAGNOSIS — R10.30 LOWER ABDOMINAL PAIN: ICD-10-CM

## 2022-05-10 DIAGNOSIS — K50.814 CROHN'S DISEASE OF BOTH SMALL AND LARGE INTESTINE WITH ABSCESS: Primary | ICD-10-CM

## 2022-05-10 PROCEDURE — 99214 OFFICE O/P EST MOD 30 MIN: CPT | Performed by: INTERNAL MEDICINE

## 2022-05-10 RX ORDER — DICYCLOMINE HYDROCHLORIDE 10 MG/1
10 CAPSULE ORAL 3 TIMES DAILY
Qty: 90 CAPSULE | Refills: 3 | Status: SHIPPED | OUTPATIENT
Start: 2022-05-10

## 2022-05-10 NOTE — PROGRESS NOTES
Chief Complaint   Patient presents with   • Crohn's Disease     Subjective     HPI  Adrienne Rodrigues is a 26 y.o. female who presents today for office follow up. She carries diagnosis of CD since age 12, with involvement of terminal ileum.       She has been maintained on Humira biweekly injections since 2018. She feels like the medicine is less effective than previously.  She reports she frequently has injection site reactions with redness and itching.       Patient was last seen 9/2021.   She continue to report issues with abdominal pain, diarrhea, and nausea for at least 1 year.  She reports bloating and mucousy stool.   Abdominal pain is localized to the mid abdominal area but radiates to the lower abdominal area described as an aching sensation that comes and goes.  Symptoms seem to be intermittent.  She had CT scan A/P with IV contrast in November at [facility] which was read as being unremarkable.  Her recent CBC and CMP are WNL.  Last colonoscopy 2018 shows inflammation of the ICV and I could only intubate the very distal TI.      Objective   Vitals:    05/10/22 1500   BP: 111/83   Temp: 98 °F (36.7 °C)       Physical Exam  Vitals reviewed.   Constitutional:       Appearance: She is well-developed.   HENT:      Head: Normocephalic and atraumatic.   Abdominal:      General: Bowel sounds are normal. There is no distension.      Palpations: Abdomen is soft. There is no mass.      Tenderness: There is no abdominal tenderness.      Hernia: No hernia is present.   Skin:     General: Skin is warm and dry.   Neurological:      Mental Status: She is alert and oriented to person, place, and time.   Psychiatric:         Behavior: Behavior normal.         Thought Content: Thought content normal.         Judgment: Judgment normal.                [unfilled]  Assessment:     1. Crohn's disease of both small and large intestine with abscess (HCC)    2. Lower abdominal pain      Plan:   Referral for MRI  enterography to assess for small bowel disease activity.  Her recent CRP was normal.  Her CT from 11/2021 was read as normal.  It is unclear if her symptoms represent active CD or perhaps element of underlying IBS.  She is having frequent local skin reactions to the Humira and would like to discuss alternative biologic.  I think entyvio would be a good choice as it is an 8 week infusion with excellent safety profile given its gut selective nature.  Will submit request to her insurance.  I have refilled her bentyl and zofran today.              Vaibhav Meléndez M.D.  [facility]  [address] [place] [address]  Office: [phone]

## 2022-05-11 ENCOUNTER — TELEPHONE (OUTPATIENT)
Dept: GASTROENTEROLOGY | Facility: CLINIC | Age: 27
End: 2022-05-11

## 2022-05-11 NOTE — TELEPHONE ENCOUNTER
----- Message from Vaibhav Meléndez MD sent at 5/11/2022  7:50 AM EDT -----  Can we please work on approval for Entyvio - would like to change her from Humira to envytio due to intolerance to humira (injection site reactions) as well as ongoing symptoms.

## 2022-05-12 NOTE — TELEPHONE ENCOUNTER
I am currently working on getting all of the paperwork ready for the Entyvio Initial PA. I will have all the paperwork ready and faxed over once Dr. Alvarado signs the paperwork. Dr. Meléndez is in the office next on Monday afternoon May 16, 2022.

## 2022-05-13 NOTE — TELEPHONE ENCOUNTER
Entyvio initial start paperwork placed on Dr. Meléndez's desk to sign in case he stops by the office before Monday. Will fax over and initiate PA once I have obtained Dr. Meléndez's signature.

## 2022-05-16 NOTE — TELEPHONE ENCOUNTER
Tried to fax paper work over to [facility] but fax will not go through. I will keep trying between patients today.

## 2022-05-19 NOTE — TELEPHONE ENCOUNTER
I emailed Deana with [facility] this morning to check the status of this patients Entyvio prior authorization. I am waiting to hear back from Deana for an update.

## 2022-05-20 NOTE — TELEPHONE ENCOUNTER
Prior authorization approved and valid 05/19/2022 through 05/18/2023. Deana with [facility] will reach out to patient to get her scheduled for the Entyvio and go over any questions that she may have.

## 2022-06-28 ENCOUNTER — HOSPITAL ENCOUNTER (OUTPATIENT)
Dept: MRI IMAGING | Facility: HOSPITAL | Age: 27
Discharge: HOME | End: 2022-06-28
Admitting: INTERNAL MEDICINE

## 2022-06-28 DIAGNOSIS — K50.814 CROHN'S DISEASE OF BOTH SMALL AND LARGE INTESTINE WITH ABSCESS: ICD-10-CM

## 2022-06-28 PROCEDURE — 72197 MRI PELVIS W/O & W/DYE: CPT

## 2022-06-28 PROCEDURE — 74183 MRI ABD W/O CNTR FLWD CNTR: CPT

## 2022-06-28 PROCEDURE — 0 GADOBENATE DIMEGLUMINE 529 MG/ML SOLUTION: Performed by: INTERNAL MEDICINE

## 2022-06-28 PROCEDURE — A9577 INJ MULTIHANCE: HCPCS | Performed by: INTERNAL MEDICINE

## 2022-06-28 RX ADMIN — GADOBENATE DIMEGLUMINE 12 ML: 529 INJECTION, SOLUTION INTRAVENOUS at 12:21

## 2022-07-08 ENCOUNTER — TELEPHONE (OUTPATIENT)
Dept: GASTROENTEROLOGY | Facility: CLINIC | Age: 27
End: 2022-07-08

## 2022-07-08 NOTE — TELEPHONE ENCOUNTER
Called pt and advised of Dr Meléndez's note.  Pt verbalized understanding.     Pt states her first Entyvio infusion is on 7/15.  Update sent to Dr Meléndez.     Follow up appt made with Lisa DE LA O on 7/22 @11am.

## 2022-07-08 NOTE — TELEPHONE ENCOUNTER
----- Message from Vaibhav Meléndez MD sent at 7/7/2022 12:58 PM EDT -----  MR does show evidence of active Crohn's disease in the end of her small intestine.  Where is she in the process of getting transitioned from Humira to Entyvio.  Please schedule f/u with [facility] in next 2-4 weeks.

## 2022-07-28 ENCOUNTER — TELEPHONE (OUTPATIENT)
Dept: GASTROENTEROLOGY | Facility: CLINIC | Age: 27
End: 2022-07-28

## 2022-07-28 DIAGNOSIS — K50.814 CROHN'S DISEASE OF BOTH SMALL AND LARGE INTESTINE WITH ABSCESS: Primary | ICD-10-CM

## 2022-07-28 NOTE — TELEPHONE ENCOUNTER
Caller: Adrienne Rodrigues    Relationship: Self    Best call back number: [phone]    What is the best time to reach you: ANYTIME    Who are you requesting to speak with (clinical staff, provider,  specific staff member): CLINICAL STAFF    What was the call regarding: PATIENT NEEDED APPROVAL TO GO TO A NEW INFUSION OFFICE. PATIENT NEEDS ORDERS SENT BEFORE AUG 2ND

## 2022-07-28 NOTE — TELEPHONE ENCOUNTER
Patient states she had a horrible experience with [facility]. Requesting to change her outpatient infusion center to:     [facility])  [address] [place], [place] [id number] [phone]    Advised an update will be sent to practice manager and Kimberly for the change. She verb understanding.

## 2022-07-29 NOTE — TELEPHONE ENCOUNTER
Message sent to Brandy to advise as I do not think that we refer out to [facility].   Our office refers out to [facility], [facility], [facility] and [facility].

## 2022-08-02 ENCOUNTER — TELEPHONE (OUTPATIENT)
Dept: GASTROENTEROLOGY | Facility: CLINIC | Age: 27
End: 2022-08-02

## 2022-08-02 NOTE — TELEPHONE ENCOUNTER
Call received from Delmar @ [facility] re: orders received for Entyvio infusion.  States pt has been approved thru [facility], and once this occurs - Delmar cannot get approved at [facility].  [facility] because of this, pt needs to stay with [facility].     Delmar declines to give call back # - states leaving on vacation now thru 8/15  and does not have co-worker to handle.      Message to Shaneka.

## 2022-08-18 RX ORDER — VEDOLIZUMAB 300 MG/5ML
300 INJECTION, POWDER, LYOPHILIZED, FOR SOLUTION INTRAVENOUS ONCE
Qty: 5 ML | Refills: 0 | Status: SHIPPED | OUTPATIENT
Start: 2022-08-18 | End: 2022-08-18

## 2022-08-26 ENCOUNTER — TELEPHONE (OUTPATIENT)
Dept: GASTROENTEROLOGY | Facility: CLINIC | Age: 27
End: 2022-08-26

## 2022-09-01 ENCOUNTER — TELEPHONE (OUTPATIENT)
Dept: GASTROENTEROLOGY | Facility: CLINIC | Age: 27
End: 2022-09-01

## 2022-09-01 NOTE — TELEPHONE ENCOUNTER
Caller: RONEN/GERALDO MERCADO    Best call back number: [phone]    Who are you requesting to speak with (clinical staff, provider,  specific staff member): CLINICAL STAFF    Do you require a callback: YES

## 2022-09-01 NOTE — TELEPHONE ENCOUNTER
Returned call to [employer]. Spoke with Sophia, she states there are multiple questions regarding patient's Entyvio.  Advised will send update to the person who works on Entyvio PA's.   She verb understnading.

## 2022-09-02 NOTE — TELEPHONE ENCOUNTER
Called and spoke with Nahomi at [email or web address] and they will reach out to Duane GUTIERRES to complete the PA and go over specific questions for that facility/ infusion center.     Duane GUTIERRES does all needed prior authorizations for patient infusions.     Per Nahomi they are needing some specific questions answered about the site of care where the infusion will take place.      Nahomi was given the phone number to that infusion site to reach out to them to obtain the information needed.
